# Patient Record
(demographics unavailable — no encounter records)

---

## 2024-10-09 NOTE — ASSESSMENT
[FreeTextEntry1] : *Remote history left breast cancer status postlumpectomy chemoradiation with apparent axillary node metastasis. Subsequent recurrences most recently 2019 treated with Herceptin. Will complete 5-year exemestane February 2025. PET scan November 2023 prompted by patient report of bone pain, showed no evidence of neoplasm.[place] oncologist Dr. Alvaro Mtz [phone]. Still has a Port-A-Cath that needs to be flushed every 2 months, Lastsurveillance imaging mmgm/US 7/29/24, lastPET 11/2023. Referred to oncology locally. Patient reminded to call to schedule follow up, PaC needs to be flushed q 2 months, next 10/2024. perhaps can be removed at this time. mmgm/US 7/29/24 without evidence of recurrent disease. PET/CT skull thigh 11/5/2023 showed no FDG avid disease, no comment on any pelvic or hip lesions in the rpt provided.  * GERD, chronically on pantoprazole without breakthrough symptoms solid food dysphagia or melena. EGD 5/27/21 Hpylori negative, moderate duodenitis with moderate villous blunting, but inconsistent with celiac disease.  *Neutrophilia, resolved. Outside labs show ANC rising from 7.5 to 9.7, March to May 2024. Pt denied fevers, night sweats, and rpt CBC normal ANC, CRP, blood culture. Tick panel pending. UCx see below. TKA 3/2024 looks normal.  *Hyperlipidemia. Chronically on simvastatin 40, LDL 83 HDL 42. LFTs normal. Continue.  *3 drug hypertension. Amlodipine 2.5 mg at bedtime, carvedilol 12.5 mg twice daily, and lisinopril dose reduced from 20 mg twice daily to 20 mg at bedtime in theory. Pt confused re which BP meds she's taking, when. Will bring to f/u appt.  . No known cardiovascular disease per patient,but sees cardiology annually. PCP Dr. Pasquale Baird [phone]. Normal microalbumin, eGFR 48. She had echo and stress test in the past, apparently not recently. BP acceptable despite lisinopril dose reduction in office, but home radings 160s-170s/80s in AM, 140s-150s/70s in PM.  Need confirm compliance and accuracy, while avoiding orthostatism.  *Left macular degeneration. Just saw her ophthalmologist August 2024. Never injected. Does not take AREDS.  *Vitamin D deficiency.  *Osteopenia. Chronically on 2000 IU vitamin D3. S 0, Hip -2.1 DEXA8/2024  *Right total knee arthroplasty, March 2024. Completed physical therapy. Doing well. Denies giveaway weakness or pain in her other joints.  * Routine adult health maintenance Vaccinations: Tdap Prevnar 20 Shingrix COVID flu indicated, outside vaccination record from PCP not provided.Gave flu today.  Colon cancer screening: Last colonoscopy around age 80. Denies personal history of adenomas or family history colorectal cancer Breast cancer Surveillance: see above Cervical cancer screen screening: Aged out Aged out of H CV HIV screening Time 25min

## 2024-10-09 NOTE — HISTORY OF PRESENT ILLNESS
[FreeTextEntry1] : f/u HTN [de-identified] : Most pleasant 85-year-old white female recentlynmoved from [place] recently troubled by lightheadedness while upright, never while supine, with apparent recent readdition of lisinopril several months ago to her other 2 blood pressure medicines, with 10mmHg drop supine to sitting in the office. EKG head CT labs non contributory. Lisinopril dose reduction with relief of orthostatic symptoms.   Past medical history significant for: *remote history of Left breast cancer with jessie and potentially bony metastatic involvement status post lumpectomy, unspecified chemo radiation, back in the 1990s. subsequent recurrences most recently 2019 treated with Herceptin via her Port-A-Cath, which she completed. Will complete 5-year exemestane February 2025.PET scan for some bone pain 2023 showed no evidence of neoplasm. She no longer needs the Port-A-Cath. The Port-A-Cath requires flushing every 2 months, last flushed around August 1. * GERD, chronically on pantoprazole without breakthrough symptoms solid food dysphagia or melena. She had an [id number]. *Hyperlipidemia. Chronically on simvastatin 40. *3 drug hypertension. No known cardiovascular disease per patient's, but sees cardiology annually. She had echo and stress test in the past, apparently not recently. *Left macular degeneration. Just saw her ophthalmologist August 2024. Never injected. *Vitamin D deficiency. Chronically on 9696-9138 IU vitamin D3.Osteopenia on dexa 2024 *Right total knee arthroplasty, March 2024. Completed physical therapy. Doing well. Denies giveaway weakness or pain in her other joints.

## 2024-10-18 NOTE — HISTORY OF PRESENT ILLNESS
[FreeTextEntry1] : Breast cancer hx F/U HTN [de-identified] : Most pleasant 85-year-old white female recentlynmoved from [place] recently troubled by lightheadedness while upright, never while supine, with apparent recent readdition of lisinopril several months ago to her other 2 blood pressure medicines, with 10mmHg drop supine to sitting in the office. EKG head CT labs non contributory. Lisinopril dose reduction with relief of orthostatic symptoms.  We have been unsuccessful in getting her seen by oncology, not on active chemo, just exemestane, but has a Port-A-Cath that needs to be flushed and/or removed.  Referral sent in August, outside records scanned into the system August 20.   Past medical history significant for: *remote history of Left breast cancer with jessie and potentially bony metastatic involvement status post lumpectomy, unspecified chemo radiation, back in the 1990s. subsequent recurrences most recently 2019 treated with Herceptin via her Port-A-Cath, which she completed. Will complete 5-year exemestane February 2025.PET scan for some bone pain 2023 showed no evidence of neoplasm. She no longer needs the Port-A-Cath. The Port-A-Cath requires flushing every 2 months, last flushed around August 1. * GERD, chronically on pantoprazole without breakthrough symptoms solid food dysphagia or melena. She had an [id number]. *Hyperlipidemia. Chronically on simvastatin 40. *3 drug hypertension. No known cardiovascular disease per patient's, but sees cardiology annually. She had echo and stress test in the past, apparently not recently. *Left macular degeneration. Just saw her ophthalmologist August 2024. Never injected. *Vitamin D deficiency. Chronically on 5440-0478 IU vitamin D3.Osteopenia on dexa 2024 *Right total knee arthroplasty, March 2024. Completed physical therapy. Doing well. Denies giveaway weakness or pain in her other joints.

## 2024-10-18 NOTE — ASSESSMENT
[FreeTextEntry1] : *Remote history left breast cancer status postlumpectomy chemoradiation with apparent axillary node metastasis. Subsequent recurrences most recently 2019 treated with Herceptin. Will complete 5-year exemestane February 2025. PET scan November 2023 prompted by patient report of bone pain, showed no evidence of neoplasm.[place] oncologist Dr. Alvaro Mtz [phone]. Still has a Port-A-Cath that needs to be flushed every 2 months, Last surveillance imaging mmgm/US 7/29/24, lastPET 11/2023. Referred to oncology locally. Patient reminded to call to schedule follow up, PaC needs to be flushed q 2 months, next 10/2024. perhaps can be removed at this time. mmgm/US 7/29/24 without evidence of recurrent disease. PET/CT skull thigh 11/5/2023 showed no FDG avid disease, no comment on any pelvic or hip lesions in the rpt provided.  * GERD, chronically on pantoprazole without breakthrough symptoms solid food dysphagia or melena. EGD 5/27/21 Hpylori negative, moderate duodenitis with moderate villous blunting, but inconsistent with celiac disease.  *Neutrophilia, resolved. Outside labs show ANC rising from 7.5 to 9.7, March to May 2024. Pt denied fevers, night sweats, and rpt CBC normal ANC, CRP, blood culture. Tick panel pending. UCx see below. TKA 3/2024 looks normal.  *Hyperlipidemia. Chronically on simvastatin 40, LDL 83 HDL 42. LFTs normal. Continue.  *3 drug hypertension. Amlodipine 2.5 mg at bedtime, carvedilol 12.5 mg twice daily, and lisinopril dose reduced from 20 mg twice daily to 20 mg at bedtime in theory. Pt confused re which BP meds she's taking, when. Will bring to f/u appt.  . No known cardiovascular disease per patient,but sees cardiology annually. PCP Dr. Pasquale Baird [phone]. Normal microalbumin, eGFR 48. She had echo and stress test in the past, apparently not recently. BP acceptable despite lisinopril dose reduction in office, but home radings 160s-170s/80s in AM, 140s-150s/70s in PM.  Need confirm compliance and accuracy, while avoiding orthostatism.  *Left macular degeneration. Just saw her ophthalmologist August 2024. Never injected. Does not take AREDS.  *Vitamin D deficiency. *Osteopenia. Chronically on 2000 IU vitamin D3. S 0, Hip -2.1 DEXA8/2024  *Right total knee arthroplasty, March 2024. Completed physical therapy. Doing well. Denies giveaway weakness or pain in her other joints.  * Routine adult health maintenance Vaccinations: Tdap Prevnar 20 2024 Shingrix COVID ndicated, outside vaccination record from PCP not provided.Gave Prevnar 20 today.  Colon cancer screening: Last colonoscopy around age 80. Denies personal history of adenomas or family history colorectal cancer Breast cancer Surveillance: see above Cervical cancer screen screening: Aged out Aged out of H CV HIV screening Time 25min.

## 2024-11-04 NOTE — BEGINNING OF VISIT
[0] : 2) Feeling down, depressed, or hopeless: Not at all (0) [PHQ-2 Negative] : PHQ-2 Negative [[id number]] : 0 [Pain Scale: ___] : On a scale of 1-10, today the patient's pain is a(n) [unfilled]. [Former] : Former [15-19] : 15-19 [> 15 Years] : > 15 Years [Date Discussed (MM/DD/YY): ___] : Discussed: [unfilled] [With Patient/Caregiver] : with Patient/Caregiver [Abdominal Pain] : no abdominal pain [Vomiting] : no vomiting [Constipation] : no constipation [Diarrhea Character] : Diarrhea: Grade 0

## 2024-11-04 NOTE — BEGINNING OF VISIT
[0] : 2) Feeling down, depressed, or hopeless: Not at all (0) [PHQ-2 Negative] : PHQ-2 Negative [[id number]] : 0 [Pain Scale: ___] : On a scale of 1-10, today the patient's pain is a(n) [unfilled]. [Former] : Former [15-19] : 15-19 [> 15 Years] : > 15 Years [Date Discussed (MM/DD/YY): ___] : Discussed: [unfilled] [With Patient/Caregiver] : with Patient/Caregiver [Abdominal Pain] : no abdominal pain [Vomiting] : no vomiting [Constipation] : no constipation [Diarrhea Character] : Diarrhea: Grade 0

## 2024-11-04 NOTE — BEGINNING OF VISIT
[0] : 2) Feeling down, depressed, or hopeless: Not at all (0) [PHQ-2 Negative] : PHQ-2 Negative [[id number]] : 0 [Pain Scale: ___] : On a scale of 1-10, today the patient's pain is a(n) [unfilled]. [Former] : Former [15-19] : 15-19 [> 15 Years] : > 15 Years [Date Discussed (MM/DD/YY): ___] : Discussed: [unfilled] [With Patient/Caregiver] : with Patient/Caregiver [Abdominal Pain] : no abdominal pain [Vomiting] : no vomiting [Constipation] : no constipation [Diarrhea Character] : Diarrhea: Grade 0

## 2024-11-07 NOTE — PHYSICAL EXAM
[Normal Conjunctiva] : normal conjunctiva [Normal Gait] : normal gait [No Edema] : no edema [Normal] : alert and oriented, normal memory [de-identified] : well appearing [de-identified] : supple [de-identified] : JVP ~ 7 cm H20, RRR, s1, s2, no murmurs, chemo port present [de-identified] : unlabored respirations, clear lung fields [de-identified] : non-distended

## 2024-11-07 NOTE — REVIEW OF SYSTEMS
[Headache] : no headache [Blurry Vision] : no blurred vision [SOB] : no shortness of breath [Chest Discomfort] : no chest discomfort [Syncope] : no syncope [Cough] : no cough [Abdominal Pain] : no abdominal pain [Rash] : no rash [Dizziness] : no dizziness [Easy Bruising] : no tendency for easy bruising

## 2024-11-07 NOTE — CARDIOLOGY SUMMARY
[de-identified] : ECG (9/12/24): sinus bradycardia, diffuse low voltage, nonspecific ST abnormalities ECG (11/7/24): sinus rhythm [de-identified] : TTE (9/2024): LVEF 55-60%. Normal RV size and function. Moderate posterior MAC. Mild MR. AV not well visualized, appears calcified with decreased opening. Mild AR. No pericardial effusion.

## 2024-11-07 NOTE — CARDIOLOGY SUMMARY
[de-identified] : ECG (9/12/24): sinus bradycardia, diffuse low voltage, nonspecific ST abnormalities ECG (11/7/24): sinus rhythm [de-identified] : TTE (9/2024): LVEF 55-60%. Normal RV size and function. Moderate posterior MAC. Mild MR. AV not well visualized, appears calcified with decreased opening. Mild AR. No pericardial effusion.

## 2024-11-07 NOTE — ASSESSMENT
[FreeTextEntry1] : Assessment: Henrietta Dudley is an 85 year old woman with past medical history of Breast cancer (s/p left mastectomy and chemotherapy, on Exemestane), Hypertension, Hyperlipidemia & GERD who presents for follow up visit.  The patient reports that she recently moved from [place]. She reports feeling well, denies exertional angina or dyspnea. ECG consistent with normal sinus rhythm. BP stable. Echo (9/2024) consistent with normal LVEF 55-60%, normal RV size and function, moderate mitral annular calcification and aortic valve not well visualized but appears calcified with decreased opening. Labs (8/2024) consistent with LDL 83 mg/dl and GFR 48.  Recommendations: [] Hypertension: BP appears stable on current medical therapy, continue: Amlodipine 2.5 mg daily, Carvedilol 12.5 mg BID and Lisinopril 20 mg daily. Recommend DASH diet plan.  [] Hyperlipidemia: LDL well controlled at 83 mg/dl, continue Simvastatin 40 mg daily [] History of cardiomyopathy: Prior records from [place] obtained and patient had history of mild cardiomyopathy with reported LVEF 45-50%, however echo here with LVEF 55-60%. No clinical signs of CHF that would warrant guideline directed medical therapy at this time. [] Return to office:  4 months

## 2024-11-07 NOTE — HISTORY OF PRESENT ILLNESS
[FreeTextEntry1] : Henrietta Dudley is an 85 year old woman with past medical history of Breast cancer (s/p left mastectomy and chemotherapy, on Exemestane), Hypertension, Hyperlipidemia & GERD who presents for follow up visit.  Since her last visit the patient reports feeling well. She denies exertional chest pain or shortness of breath.

## 2024-11-07 NOTE — PHYSICAL EXAM
[Normal Conjunctiva] : normal conjunctiva [Normal Gait] : normal gait [No Edema] : no edema [Normal] : alert and oriented, normal memory [de-identified] : well appearing [de-identified] : supple [de-identified] : JVP ~ 7 cm H20, RRR, s1, s2, no murmurs, chemo port present [de-identified] : unlabored respirations, clear lung fields [de-identified] : non-distended

## 2025-01-03 NOTE — PHYSICAL EXAM
[Normal] : no acute distress, well nourished, well developed and well-appearing [de-identified] :  right low back tenderness.  No trochanteric  Or SI joint tenderness.  Positive straight leg raise. able to march on her heels or balls of her feet.  Sensation preserved to light touch medial versus lateral foot no pain with lumbar extension.

## 2025-01-03 NOTE — HISTORY OF PRESENT ILLNESS
[FreeTextEntry1] : Right low back pain radiating to foot [de-identified] : Most pleasant 85-year-old white female with past medical history significant for: *remote history of Left breast cancer with jessie and potentially bony metastatic involvement status post lumpectomy, unspecified chemo radiation, back in the 1990s. subsequent recurrences most recently 2019 treated with Herceptin via her Port-A-Cath, which she completed. Will complete 5-year exemestane February 2025.PET scan for some bone pain 2023 showed no evidence of neoplasm. She apparently no longer needs the Port-A-Cath. The Port-A-Cath requires flushing every 2 months, last flushed around August 1. * GERD, chronically on pantoprazole without breakthrough symptoms solid food dysphagia or melena. She had an [id number]. *Hyperlipidemia. Chronically on simvastatin 40. *3 drug hypertension. No known cardiovascular disease per patient's, but sees cardiology annually. She had echo and stress test in the past, apparently not recently. *Left macular degeneration. Just saw her ophthalmologist August 2024. Never injected. *Vitamin D deficiency. Chronically on 0375-1677 IU vitamin D3.Osteopenia on dexa 2024 *Right total knee arthroplasty, March 2024. Completed physical therapy. Doing well. Denies giveaway weakness or pain in her other joints.  ...who attends for 2 day history of Right low back pain radiating to right lateral foot.  denies new incontinence saddle anesthesia leg numbness or weakness.  Pain worse with lumbar flexion.  She has not fallen.  Tried a little bit of Tylenol with incomplete benefit.  Has been working as an aid.

## 2025-01-03 NOTE — ASSESSMENT
[FreeTextEntry1] : *  Right S1 radiculopathy.  Short course NSAIDs muscle relaxant at bedtime scheduled Tylenol 1000 mg 3 times daily PT x-ray referral to orthospine in a month if not improving.    Time 20 minutes

## 2025-01-13 NOTE — PHYSICAL EXAM
[Antalgic] : antalgic [SLR] : positive straight leg raise [de-identified] : seen with her daughter [de-identified] : right knee incision [de-identified] : ACC: [id number] EXAM: XR LS SPINE W OBLIQUES MIN 4V ORDERED BY: CLAUDE M BRIDGES  PROCEDURE DATE: 01/03/2025    INTERPRETATION: Radiographs of the lumbar spine CPT 60811  CLINICAL INFORMATION: Low back pain x3 days.  TECHNIQUE: Frontal and lateral views of the lumbar spine, limited oblique views, and lateral coned-down view of the lumbosacral junction were obtained.  FINDINGS: No previous examinations are available for review.  No gross vertebral body fracture is demonstrated. Significant degenerative changes with osteophyte formation, disc space narrowing, endplate sclerosis, and vacuum disc phenomenon are noted at the L2-L3 level. Disc space narrowing is also noted at the L3-L4, and L4-L5 levels. The sacroiliac joints appear symmetric bilaterally. Lordosis is maintained.  Incidental note is made of calcification of the distal abdominal aorta.  IMPRESSION: Degenerative changes noted in the lumbar spine as noted above.  --- End of Report ---      OMA WRAY MD; Attending Radiologist This document has been electronically signed. Alexander 3 2025 3:16PM

## 2025-01-13 NOTE — PHYSICAL EXAM
[Antalgic] : antalgic [SLR] : positive straight leg raise [de-identified] : seen with her daughter [de-identified] : right knee incision [de-identified] : ACC: [id number] EXAM: XR LS SPINE W OBLIQUES MIN 4V ORDERED BY: CLAUDE M BRIDGES  PROCEDURE DATE: 01/03/2025    INTERPRETATION: Radiographs of the lumbar spine CPT 21108  CLINICAL INFORMATION: Low back pain x3 days.  TECHNIQUE: Frontal and lateral views of the lumbar spine, limited oblique views, and lateral coned-down view of the lumbosacral junction were obtained.  FINDINGS: No previous examinations are available for review.  No gross vertebral body fracture is demonstrated. Significant degenerative changes with osteophyte formation, disc space narrowing, endplate sclerosis, and vacuum disc phenomenon are noted at the L2-L3 level. Disc space narrowing is also noted at the L3-L4, and L4-L5 levels. The sacroiliac joints appear symmetric bilaterally. Lordosis is maintained.  Incidental note is made of calcification of the distal abdominal aorta.  IMPRESSION: Degenerative changes noted in the lumbar spine as noted above.  --- End of Report ---      OMA WRAY MD; Attending Radiologist This document has been electronically signed. Alexander 3 2025 3:16PM

## 2025-02-07 NOTE — HISTORY OF PRESENT ILLNESS
[FreeTextEntry8] : Most pleasant 85-year-old white female with past medical history significant for: *remote history of Left breast cancer with jessie and potentially bony metastatic involvement status post lumpectomy, unspecified chemo radiation, back in the 1990s. subsequent recurrences most recently 2019 treated with Herceptin via her Port-A-Cath, which she completed. Will complete 5-year exemestane February 2025.PET scan for some bone pain 2023 showed no evidence of neoplasm. She apparently no longer needs the Port-A-Cath. The Port-A-Cath requires flushing every 2 months, last flushed around August 1. * GERD, chronically on pantoprazole without breakthrough symptoms solid food dysphagia or melena. She had an [id number]. *Hyperlipidemia. Chronically on simvastatin 40. *3 drug hypertension. No known cardiovascular disease per patient's, but sees cardiology annually. She had echo and stress test in the past, apparently not recently. *Left macular degeneration. Just saw her ophthalmologist August 2024. Never injected. *Vitamin D deficiency. Chronically on 2316-4693 IU vitamin D3.Osteopenia on dexa 2024 *Right total knee arthroplasty, March 2024. Completed physical therapy. Doing well. Denies giveaway weakness or pain in her other joints.  ... Seen January 3 for right-sided sciatica treated with gabapentin and Medrol Dosepak and orthospine 3 weeks ago subsequently seen in urgent care February 3 for 3-day history of sore throat cough congestion with a unremarkable exam, no swab done, prescribed Tessalon and azelastine, elevated blood pressure in urgent care, who attends for urgent care follow-up.  Flu shot October 9, 2024, last COVID booster more remotely. No RSV.  She has cough, wheeze, post tussive nausea. Trouble sleeping w cough.

## 2025-02-07 NOTE — ASSESSMENT
[FreeTextEntry1] : *Asthmatic bronchitis, postviral.  Given duration of symptoms seeming progression of cough though nonproductive without fever we will update chest x-ray start prednisone taper over 12 days starting at 30 mg daily prescribed azithromycin and renew her albuterol.  *History of breast cancer renewed her exemestane.  Messaged her oncologist about her illness as she will be unable to attend appointment today.  Time 25 minutes

## 2025-02-07 NOTE — PHYSICAL EXAM
[No Lymphadenopathy] : no lymphadenopathy [Normal] : affect was normal and insight and judgment were intact [de-identified] : Clear rhinorrhea minimal coryza.  Oropharynx clear.  TMs appear normal. [de-identified] : Scattered squeaks and expiratory wheezes.  Decreased air entry.

## 2025-02-15 NOTE — PHYSICAL EXAM
[Normal Conjunctiva] : normal conjunctiva [Normal Gait] : normal gait [No Edema] : no edema [Normal] : alert and oriented, normal memory [de-identified] : well appearing [de-identified] : supple [de-identified] : JVP ~ 7 cm H20, RRR, s1, s2, 3/6 systolic murmur, chemo port present [de-identified] : unlabored respirations, clear lung fields [de-identified] : non-distended

## 2025-02-15 NOTE — CARDIOLOGY SUMMARY
[de-identified] : ECG (9/12/24): sinus bradycardia, diffuse low voltage, nonspecific ST abnormalities ECG (2/14/25): sinus bradycardia, borderline inferior Q waves [de-identified] : TTE (9/2024): LVEF 55-60%. Normal RV size and function. Moderate posterior MAC. Mild MR. AV not well visualized, appears calcified with decreased opening. Mild AR. No pericardial effusion.

## 2025-02-15 NOTE — REVIEW OF SYSTEMS
[SOB] : no shortness of breath [Cough] : no cough [Dyspnea on exertion] : dyspnea during exertion [Headache] : no headache [Blurry Vision] : no blurred vision [Chest Discomfort] : no chest discomfort [Lower Ext Edema] : no extremity edema [Palpitations] : no palpitations [Syncope] : no syncope [Abdominal Pain] : no abdominal pain [Rash] : no rash [Dizziness] : no dizziness [Easy Bruising] : no tendency for easy bruising

## 2025-02-15 NOTE — HISTORY OF PRESENT ILLNESS
[FreeTextEntry1] : Henrietta Dudley is an 85 year old woman with past medical history of Breast cancer (s/p left mastectomy and chemotherapy, on Exemestane), Hypertension, Hyperlipidemia & GERD who presents for follow up visit.  The patient reports that she has had shortness of breath on exertion, sometimes associated with wheezing and mostly relieved with inhaler. Her PCP treated her for asthma exacerbation with prednisone and albuterol which has helped most of her symptoms. She denies chest pain on exertion. Denies leg swelling. Has noticed shortness of breath when laying flat.

## 2025-02-15 NOTE — PHYSICAL EXAM
[Normal Conjunctiva] : normal conjunctiva [Normal Gait] : normal gait [No Edema] : no edema [Normal] : alert and oriented, normal memory [de-identified] : well appearing [de-identified] : supple [de-identified] : JVP ~ 7 cm H20, RRR, s1, s2, 3/6 systolic murmur, chemo port present [de-identified] : unlabored respirations, clear lung fields [de-identified] : non-distended

## 2025-02-15 NOTE — CARDIOLOGY SUMMARY
[de-identified] : ECG (9/12/24): sinus bradycardia, diffuse low voltage, nonspecific ST abnormalities ECG (2/14/25): sinus bradycardia, borderline inferior Q waves [de-identified] : TTE (9/2024): LVEF 55-60%. Normal RV size and function. Moderate posterior MAC. Mild MR. AV not well visualized, appears calcified with decreased opening. Mild AR. No pericardial effusion.

## 2025-02-15 NOTE — ASSESSMENT
[FreeTextEntry1] : Assessment: Henrietta Dudley is an 85 year old woman with past medical history of Breast cancer (s/p left mastectomy and chemotherapy, on Exemestane), Hypertension, Hyperlipidemia & GERD who presents for follow up visit.  The patient reports that she has had shortness of breath on exertion, sometimes associated with wheezing and mostly relieved with inhaler and was treated for asthma exacerbation with prednisone and albuterol which has helped most of her symptoms. She denies angina. ECG today consistent with sinus bradycardia and borderline inferior Q waves which is similar to a prior ECG. Echo (9/2024) consistent with normal LVEF 55-60%, normal RV size and function, moderate mitral annular calcification and aortic valve not well visualized but appears calcified with decreased opening. Labs (8/2024) consistent with LDL 83 mg/dl and GFR 48.  Recommendations: [] Dyspnea on exertion: S/p treatment for asthma exacerbation, however some symptoms still persist. Due to systolic murmur on exam, will repeat echo to re-evaluate aortic valve and also LVEF. Continue albuterol. [] Hypertension: BP appears stable on current medical therapy, continue: Amlodipine 2.5 mg daily, Carvedilol 12.5 mg BID and Lisinopril 20 mg daily. Recommend DASH diet plan.  [] Hyperlipidemia: LDL well controlled at 83 mg/dl, continue Simvastatin 40 mg daily [] History of cardiomyopathy: Prior records from [place] obtained and patient had history of mild cardiomyopathy with reported LVEF 45-50%, however echo here with LVEF 55-60%. Repeating echo as per above [] Return to office: Patient will return for echo

## 2025-02-19 NOTE — END OF VISIT
[FreeTextEntry3] : Patient being seen per physician's primary plan of care.  [Time Spent: ___ minutes] : I have spent [unfilled] minutes of time on the encounter which excludes teaching and separately reported services.

## 2025-02-19 NOTE — REVIEW OF SYSTEMS
[Diarrhea: Grade 0] : Diarrhea: Grade 0 [Joint Pain] : joint pain [Negative] : Allergic/Immunologic [Joint Stiffness] : no joint stiffness [Muscle Pain] : no muscle pain [Muscle Weakness] : no muscle weakness [FreeTextEntry9] : chronic lower back pain

## 2025-02-19 NOTE — ASSESSMENT
[FreeTextEntry1] : She is an 84 y/o  F with (T2N2M1) oligometastatic left breast ER >90%, ID positive, Her2 positive breast cancer who was treated with paclitaxel/ trastuzumab followed by trastuzumab every 3 weeks x 3 years and exemestane endocrine therapy. She had prior RT to oligometastatic sites: sternum and left femoral head. She had Pet/ CT 11/2023 which showed no evidence of disease. Reviewed we would continue to monitor off Her2 directed therapy and have her remain on endocrine therapy. She continues to tolerate exemestane well without any AE. Questions answered to her satisfaction. She is agreeable with plan. Next follow up in 4 months but earlier if any new symptoms.   Port flush: She would like to continue to maintain port for now and will maintain port flushes q2 months. Will have port flush today.    Bone mets: has been off zolendronic acid after 2 years. Will continue to monitor bones.

## 2025-02-19 NOTE — HISTORY OF PRESENT ILLNESS
[Disease: _____________________] : Disease: [unfilled] [AJCC Stage: ____] : AJCC Stage: [unfilled] [de-identified] : Age 80: metastatic breast cancer to the bone: oligometastatic She was treated on 9/15/2019 T2N2 s/p lumpectomy and ALND showing ER>90%, NJ positive, Her2 3+ breast cancer. Treated by Dr Baird/ [facility] in [place]. She underwent weekly TH followed by 1 year of trastuzumab. She had Pet/ CT done on 11/2020 showing sclerotic metastatic disease in the L femoral head. She underwent RT to the L femoral head. She was initially started on anastrozole 1/2020 but experienced insomnia and nocturia. She was on single agent Her2 directed therapy: trastuzumab for 3 years and then stopped in 2022. She was switched to exemestane on 2/2020 and has remained on therapy. She has been on surveillance with Pet/ CT and last Pet/ CT done on 11/72023 showing no new evidence of disease. She had completed 2 years of zolendronic acid.  [de-identified] : ER >90%, MO positive, Her2 positive  [de-identified] : TH 9/2019 to 2020 palliative RT to the left breast, level 3 axilla, supraclavicular region, internal mammary nodes, left sternal lesion and femoral head.  trastuzumab completed 3 years of therapy and then stopped (due to cardiomyopathy on Echo EF 45-50% from 3/2023) anastrozole 1/2020 zolendronic acid x 2 years  exemestane 2/2020 to present  [de-identified] : She continues to tolerate exemestane and has not noticed any side effects. She has a history of arthritis in her lower back and has not noticed worsening arthritic pain on medication. She takes extra strength Tylenol once daily which is able to manage pain for the day. She denies any new bone pain. No new cough or headache. She would like to continue to maintain port for now.

## 2025-02-19 NOTE — PHYSICAL EXAM
[Restricted in physically strenuous activity but ambulatory and able to carry out work of a light or sedentary nature] : Status 1- Restricted in physically strenuous activity but ambulatory and able to carry out work of a light or sedentary nature, e.g., light house work, office work [Normal] : affect appropriate [de-identified] : left lumpectomy without any palpable masses or axillary LN; no abnl masses over the R breast

## 2025-03-17 NOTE — PHYSICAL EXAM
[Normal] : affect was normal and insight and judgment were intact [de-identified] : Occasionally vague historian [de-identified] : Decreased breath sounds right side.  Dull to percussion, even with the patient upright. no crackles or wheeze. [de-identified] : Right upper sternal border murmur 3 out of 6, regular rate.

## 2025-03-17 NOTE — ASSESSMENT
[FreeTextEntry1] : *Abnormal chest CT.  Will attempt to obtain the report and discharge summary, plan follow-up chest CT by May 1st.  *Outside indication of need for EGD.  Patient denies epigastric pain solid food dysphagia melena.  Reports "lump in throat" which may be of relevance.  I am not privy to her most recent CBC.  Will attempt to obtain discharge summary to determine indication for EGD.  Has referral to GI in the [facility] system.  *Intermittent dyspnea, not clearly with exertion.  Despite antibiotics in and outpatient, still intermittently short of breath without cough fever.  We walked her quickly up and down the [place] about 150 feet dropping her baseline oxygen saturation from 97 down to 93% raising her resting heart rate from 67-78.  She felt short of breath, but post exercise exam showed only a isolated inspiratory squeak in the right lung base, and no exertional hypoxemia.  Though potentially related to abnormal CT findings, noted as well echo change therefore query angina; we will engage pulmonology for consideration of pulmonary function tests FeNO though testing sooner potential confoundment with recent PNA dx. asking for discharge summary to review recent labs as well.  *Hypertension.  Lisinopril 20 daily, carvedilol 12.5 mg twice daily, recent amlodipine dose increased to 5 mg.  Always problematic to adjust blood pressure medicine in the setting of acute illness but blood pressures are not particularly low, continue increased amlodipine for now.  She is stooling without significant peripheral edema.  *remote history of Left breast cancer with jessie and potentially bony metastatic involvement status post lumpectomy, unspecified chemo radiation, back in the 1990s. subsequent recurrences most recently 2019 treated with Herceptin via her Port-A-Cath, which she completed. Will complete 5-year exemestane February 2025.PET scan for some bone pain 2023 showed no evidence of neoplasm. She apparently no longer needs the Port-A-Cath. The Port-A-Cath requires flushing every 2 months, last flushed around August 1. * GERD, chronically on pantoprazole without breakthrough symptoms solid food dysphagia or melena. She had an [id number]. *Hyperlipidemia. Chronically on simvastatin 40. *3 drug hypertension. No known cardiovascular disease per patient's, but sees cardiology annually. She had echo and stress test in the past, apparently not recently. *Left macular degeneration. Just saw her ophthalmologist August 2024. Never injected. *Vitamin D deficiency. Chronically on 2955-4314 IU vitamin D3.Osteopenia on dexa 2024 *Right total knee arthroplasty, March 2024. Completed physical therapy. Doing well. Denies giveaway weakness or pain in her other joints.  Disposition: Follow-up 1 month

## 2025-03-17 NOTE — HISTORY OF PRESENT ILLNESS
[Post-hospitalization from ___ Hospital] : Post-hospitalization from [unfilled] Hospital [Admitted on: ___] : The patient was admitted on [unfilled] [Discharged on ___] : discharged on [unfilled] [Discharge Med List] : discharge medication list [FreeTextEntry3] : pt comes with discharge instructions. [FreeTextEntry2] : Most pleasant 85-year-old white female with past medical history significant for: *remote history of Left breast cancer with jessie and potentially bony metastatic involvement status post lumpectomy, unspecified chemo radiation, back in the 1990s. subsequent recurrences most recently 2019 treated with Herceptin via her Port-A-Cath, which she completed. Will complete 5-year exemestane February 2025.PET scan for some bone pain 2023 showed no evidence of neoplasm. She apparently no longer needs the Port-A-Cath. The Port-A-Cath requires flushing every 2 months, last flushed around August 1. * GERD, chronically on pantoprazole without breakthrough symptoms solid food dysphagia or melena. She had an [id number]. *Hyperlipidemia. Chronically on simvastatin 40. *3 drug hypertension. No known cardiovascular disease per patient's, but sees cardiology annually. She had echo and stress test in the past, apparently not recently. *Left macular degeneration. Just saw her ophthalmologist August 2024. Never injected. *Vitamin D deficiency. Chronically on 3356-4914 IU vitamin D3.Osteopenia on dexa 2024 *Right total knee arthroplasty, March 2024. Completed physical therapy. Doing well. Denies giveaway weakness or pain in her other joints.  ...Who we saw in early February for post viral asthmatic bronchitis in early February prescribed albuterol Z-Jer, prednisone taper; then c/o increasing sciatica pain rx diclofenac 75 bid and zanaflex 4mg hs 2/25, rpted didn't tolerate diclofenac (doesn't recall reason), then  3/5 lightheaded, hypotensive in the field called 911 was brought to the ER where she got some IV fluids pressure in the ER after and route IV fluids 103/65 asked to hold tizanidine. 3/7 while talking to Dr. Meng who obtained echo showing new inferior wall segment regional wall motion abnormality with plan for nuclear stress test noted to be short of breath, advised to seek medical care.  No discharge summary provided, follow-up within the [facility] system with GI for consideration of EGD, cardiology for nuclear stress test and recommendation to repeat chest CT in 4 to 6 months.  Discharge diagnosis right sided community-acquired pneumonia. Discharged on cefuroxime doxycycline increased amlodipine 5 mg.  She stopped one of the antibiotics due to GI distress.  She does not remember which 1.  Still feeling intermittently short of breath, panicky, no history of arrhythmia.  Denies ever fever cough.  Denies wheeze currently.  Stooling with prune juice.  Denies orthopnea exertional chest pain or pressure. Denies sciatica pain low back pain currently. She reports having used up her albuterol, prescribed last month with 1 refill.  Remote light smoker.  Denies history of childhood asthma.

## 2025-03-28 NOTE — PHYSICAL EXAM
[No Respiratory Distress] : no respiratory distress  [No Accessory Muscle Use] : no accessory muscle use [Clear to Auscultation] : lungs were clear to auscultation bilaterally [Normal] : affect was normal and insight and judgment were intact [de-identified] : Decreased air entry. No crackles or wheeze.O2 sats supine x 3 min95 init then 97%, 97-98% upright.

## 2025-03-28 NOTE — ASSESSMENT
[FreeTextEntry1] : * recent hospitalization for pneumonia *Recent report of episodic dyspnea and wheeze, albuterol refractory *Mild eosinophila *Rule out CAD *Early MCI. possible untreated anxietyl. Will try to get her on inhaled corticosteroid and long-acting beta agonist, already on PPI and has had Incomplete cardiac evaluation but needs a look at her coronaries via [facility]. Also provided tid prn buspar.  Upcoming appointment with pulmonology [facility]  PFTs FeNO? PET/CT 11/2023 without intrathoracic pathology except coronary calcifications.  CBC CMP BNP yesterday pretty reassuring, chest x-ray clear.  TTE March 5 noted LVEF 63%, normal inspiratory collapse of the IVC suggesting normal right atrial pressure.  Aortic stenosis estimated at mild to moderate, 9 mm mean gradient normal RV LV size.  Some question about basal inferior wall hypokinesis, mild, and diastolic dysfunction; supposed to see [facility] cardiology for ischemia evaluation.  Pressures and weight stable here today.  It was pleasure visit with Ms. Dudley today.  Answer questions best I could. Disposition: Follow-up pulmonology cardiology, see me in 3 months Time 30 minutes

## 2025-03-28 NOTE — HISTORY OF PRESENT ILLNESS
[FreeTextEntry1] : episodic SoB [de-identified] : Most pleasant 85-year-old white female with past medical history significant for: *remote history of Left breast cancer with jessie and potentially bony metastatic involvement status post lumpectomy, unspecified chemo radiation, back in the 1990s. subsequent recurrences most recently 2019 treated with Herceptin via her Port-A-Cath, which she completed. Will complete 5-year exemestane February 2025.PET scan for some bone pain 2023 showed no evidence of neoplasm. She apparently no longer needs the Port-A-Cath. The Port-A-Cath requires flushing every 2 months, last flushed around August 1. * GERD, chronically on pantoprazole without breakthrough symptoms solid food dysphagia or melena. She had an [id number]. *Hyperlipidemia. Chronically on simvastatin 40. *3 drug hypertension. No known cardiovascular disease per patient's, but sees cardiology annually. She had echo and stress test in the past, apparently not recently. *Left macular degeneration. Just saw her ophthalmologist August 2024. Never injected. *Vitamin D deficiency. Chronically on 8719-6394 IU vitamin D3.Osteopenia on dexa 2024 *Right total knee arthroplasty, March 2024. Completed physical therapy. Doing well. Denies giveaway weakness or pain in her other joints.  ...who attends for Episodic shortness of breath, she reports that night while laying down and daughter notes stops to catch her breath with ambulation, has also noted wheezing.  Also appears to get short of breath and wheezy just sitting doing nothing.  This all started after hospital adminssion for pneumonia March 7. Has been using albuterol without benefit. Recent CXR BNP CMP CBC TTE looked pretty good actually.Denies exertional chest pain presyncopal symptoms.  A bit of a vague historian, fortunately daughter Leny adds periodic SoB at rest.  Has been working as an aid, minimal effort, 2 days a week.    Albuterol without benefit, no childhood history of asthma, but former smoker quit in her 40s, perhaps 20py at most and probably less. Daughter wonders if mom has lung cancer.

## 2025-04-07 NOTE — PHYSICAL EXAM
[Normal] : no acute distress, well nourished, well developed and well-appearing [de-identified] : no wheeze. Mildly decreased air entry.

## 2025-04-07 NOTE — ASSESSMENT
[FreeTextEntry1] : *RAD. *Mild Eosinophilia. ?Extrinsic allergic asthma. Advair/tessalon/ppi. Hold off on singulair, to see pulm PFT/FeNO.  *Anxiety. Insomnia. Buspar without benefit. TSH normal. Trial trazodone 50->100mg hs, and let me know outcome in a week.  Time 25min

## 2025-04-07 NOTE — HISTORY OF PRESENT ILLNESS
[FreeTextEntry1] : ER f/u [de-identified] : Most pleasant 85-year-old white female with past medical history significant for: *remote history of Left breast cancer with jessie and potentially bony metastatic involvement status post lumpectomy, unspecified chemo radiation, back in the 1990s. subsequent recurrences most recently 2019 treated with Herceptin via her Port-A-Cath, which she completed. Will complete 5-year exemestane February 2025.PET scan for some bone pain 2023 showed no evidence of neoplasm. She apparently no longer needs the Port-A-Cath. The Port-A-Cath requires flushing every 2 months, last flushed around August 1. * GERD, chronically on pantoprazole without breakthrough symptoms solid food dysphagia or melena. She had an [id number]. *Hyperlipidemia. Chronically on simvastatin 40. *3 drug hypertension. No known cardiovascular disease per patient's, but sees cardiology annually. She had echo and stress test in the past, apparently not recently. *Left macular degeneration. Just saw her ophthalmologist August 2024. Never injected. *Vitamin D deficiency. Chronically on 6201-3147 IU vitamin D3.Osteopenia on dexa 2024 *Right total knee arthroplasty, March 2024. Completed physical therapy. Doing well. Denies giveaway weakness or pain in her other joints.  ...who attends for ER followup for Episodic shortness of breath, wheeze at night, last seen 3/28 no orthodeoxia started on advair /14 2 puffs bid then, only to end up in ED 3/31 with diffuse inspiratory and expiratory wheezing, tripoding, started on medrol dose pack and tessalon with improvement.   Pt denies childhood asthma, current itchy eyes runny nose. Is on protonix. Feels symptoms started after hospital admission for pneumonia March 7.   BNP trivially elevated in ED, CXR no acute changes. Former smoker quit in her 40s, perhaps 20py at most and probably less.

## 2025-04-09 NOTE — PROCEDURE
[FreeTextEntry1] : 4/9/2025  albuterol via neb  face mask  x one  tolerated and discussion on  proper neb use and medications

## 2025-04-09 NOTE — REVIEW OF SYSTEMS
[Cough] : cough [Sputum] : sputum [Wheezing] : wheezing [Fever] : no fever [Fatigue] : no fatigue [Recent Wt Gain (___ Lbs)] : ~T no recent weight gain [Chills] : no chills [Recent Wt Loss (___ Lbs)] : ~T no recent weight loss [Epistaxis] : no epistaxis [Sore Throat] : no sore throat [Nasal Congestion] : no nasal congestion [Postnasal Drip] : no postnasal drip [Dry Mouth] : no dry mouth [Sinus Problems] : no sinus problems [Hemoptysis] : no hemoptysis [Chest Tightness] : no chest tightness [Dyspnea] : no dyspnea [Chest Discomfort] : no chest discomfort [Palpitations] : no palpitations [Seasonal Allergies] : no seasonal allergies [GERD] : no gerd [Abdominal Pain] : no abdominal pain [Nausea] : no nausea [Vomiting] : no vomiting [Dysphagia] : no dysphagia [Bleeding] : no bleeding [Chronic Pain] : no chronic pain [Rash] : no rash [Blood Transfusion] : no blood transfusion [Clotting Disorder/ Frequent bleeding] : no clotting disorder/ frequent bleeding [Diabetes] : no diabetes [Thyroid Problem] : no thyroid problem [Obesity] : no obesity [TextBox_30] : clear

## 2025-04-09 NOTE — ASSESSMENT
[FreeTextEntry1] : 84y/o   female   1-    asthma  vs COPD ( ex-smoker and second hand smoke)    with wheezing      allergies  allergic rhinitis 2- h/o  left breast cancer  chemo RT to left  with recurrence now remission  3- vaccination per primary   recommendations 1- nebulizer x one today with teaching  2- albuterol MDI two inh  q  6  hour prn   discussed proper use 3- PFT 4- blood test 5- home care  prevention HEPA air filters  change linen x 2 week  vacuum rug   -discussion reviewed above in detail and    triggers - discussed medications

## 2025-04-09 NOTE — HISTORY OF PRESENT ILLNESS
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

## 2025-04-09 NOTE — PHYSICAL EXAM
[III] : Mallampati Class: III [Normal Appearance] : normal appearance [Supple] : supple [No Neck Mass] : no neck mass [No JVD] : no jvd [Normal Rate/Rhythm] : normal rate/rhythm [Normal S1, S2] : normal s1, s2 [No Murmurs] : no murmurs [No Resp Distress] : no resp distress [No Acc Muscle Use] : no acc muscle use [Clear to Auscultation Bilaterally] : clear to auscultation bilaterally [Kyphosis] : kyphosis [Benign] : benign [Not Tender] : not tender [No Masses] : no masses [Soft] : soft [No Hernias] : no hernias [Normal Bowel Sounds] : normal bowel sounds [Normal Gait] : normal gait [Gait - Sufficient For Exercise Testing] : gait sufficient for exercise testing [No Clubbing] : no clubbing [No Edema] : no edema [No Rash] : no rash [No Motor Deficits] : no motor deficits [Normal Affect] : normal affect [TextBox_2] : pleasant f no distress  no cough no sob [TextBox_11] : no lesion moist

## 2025-04-28 NOTE — HISTORY OF PRESENT ILLNESS
[FreeTextEntry1] :  aurea, recent [de-identified] : Most pleasant 85-year-old white female with past medical history significant for: *remote history of Left breast cancer with jessie and potentially bony metastatic involvement status post lumpectomy, unspecified chemo radiation, back in the 1990s. subsequent recurrences most recently 2019 treated with Herceptin via her Port-A-Cath, which she completed. Now on exemestane February 2025.PET scan for some bone pain 2023 showed no evidence of neoplasm.  * GERD, chronically on pantoprazole without breakthrough symptoms solid food dysphagia or melena. She had an [id number]. *Hyperlipidemia. Chronically on simvastatin 40. *3 drug hypertension. No known cardiovascular disease per patient's, but sees cardiology annually. She had echo and stress test in the past, recent TTE. *Left macular degeneration. ophthalmologist August 2024. Never injected. *Vitamin D deficiency. Chronically on 5486-7655 IU vitamin D3.Osteopenia on dexa 2024 *Right total knee arthroplasty, March 2024. Completed physical therapy. Doing well. Denies giveaway weakness or pain in her other joints.  ...who has been struggling with intermittent wheezing since pneumonia [facility] early 3/2025, started on ICS/JOSEP seemingly improving as no longer wheezing not using inhalers consistently, last flare 3/31 medrol dosepak.  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 New Eosinophilia 3/2025, vs 8/2024, normal total IgE. AEC 0.6 March 5 ,1 March 27, 1.9 March 31.  I had wanted to do MoCA on this pt, as I am increasingly concerned about her ability to well manage her meds. She's using a pill box. Her meds include a mix of diff tabs that my pill ID couldnt ID in her mobic bottle, which had a few mobic tabs left. Denies sciatica at this time. She also has apparently been taking 2.5 and 5 mg amlodipine after we increased amlodipine from 2.5 to 5mg 11/2024.

## 2025-04-28 NOTE — ASSESSMENT
[FreeTextEntry1] : *RAD. *Mild but rising Eosinophilia. Not wheezing not taking inhalers regularly. Makes extrinsic allergic asthma less likely. Nice allergy panels by Pulm, pt asked to avoid egg products. I messaged her Oncology team re recommendations for Heme, and referred pt to Allergy/Immunology. Will stop amlodipine and lisinopril, start losartan 100mg and check CBC BP in 7-10 days.  *Anxiety. Insomnia. Buspar without benefit. TSH normal. Trial trazodone 50->100mg hs, but not taking every night she reports. Uncertain benefit on nights she does take.  *MCI, likely. MoCA on rtn.I removed the pills the pt is no longer to take from her bag (buspar lisinopril amlodipine mobic and some unidentified capsules in the mobic bottle).  Time 35min

## 2025-04-28 NOTE — HISTORY OF PRESENT ILLNESS
[FreeTextEntry1] :  aurea, recent [de-identified] : Most pleasant 85-year-old white female with past medical history significant for: *remote history of Left breast cancer with jessie and potentially bony metastatic involvement status post lumpectomy, unspecified chemo radiation, back in the 1990s. subsequent recurrences most recently 2019 treated with Herceptin via her Port-A-Cath, which she completed. Now on exemestane February 2025.PET scan for some bone pain 2023 showed no evidence of neoplasm.  * GERD, chronically on pantoprazole without breakthrough symptoms solid food dysphagia or melena. She had an [id number]. *Hyperlipidemia. Chronically on simvastatin 40. *3 drug hypertension. No known cardiovascular disease per patient's, but sees cardiology annually. She had echo and stress test in the past, recent TTE. *Left macular degeneration. ophthalmologist August 2024. Never injected. *Vitamin D deficiency. Chronically on 1247-6522 IU vitamin D3.Osteopenia on dexa 2024 *Right total knee arthroplasty, March 2024. Completed physical therapy. Doing well. Denies giveaway weakness or pain in her other joints.  ...who has been struggling with intermittent wheezing since pneumonia StFr early 3/2025, started on ICS/JOSEP seemingly improving as no longer wheezing not using inhalers consistently, last flare 3/31 medrol dosepak.  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 New Eosinophilia 3/2025, vs 8/2024, normal total IgE. AEC 0.6 March 5 ,1 March 27, 1.9 March 31.  I had wanted to do MoCA on this pt, as I am increasingly concerned about her ability to well manage her meds. She's using a pill box. Her meds include a mix of diff tabs that my pill ID couldnt ID in her mobic bottle, which had a few mobic tabs left. Denies sciatica at this time. She also has apparently been taking 2.5 and 5 mg amlodipine after we increased amlodipine from 2.5 to 5mg 11/2024.

## 2025-05-14 NOTE — PHYSICAL EXAM
[Normal] : no posterior cervical lymphadenopathy and no anterior cervical lymphadenopathy [de-identified] : MoCA 18/30 in this high school graduate, consistent with dementia.

## 2025-05-14 NOTE — ASSESSMENT
[FreeTextEntry1] : *RAD. *Mild but rising Eosinophilia. Not wheezing not taking inhalers regularly. Makes extrinsic allergic asthma less likely. Nice allergy panels by Pulm, pt asked to avoid egg products. I messaged her Oncology team re recommendations for Heme, and referred pt to Allergy/Immunology. Stopped amlodipine and lisinopril, started losartan 100mg and check CBC now. Upcoming A&I consultation.  *Anxiety. Insomnia. Buspar without benefit. TSH normal. Trial trazodone 50->100mg hs, but not taking every night she reports. Uncertain benefit on nights she does take. Consistency and compliance confirmation will help management.  *HTN. Seemingly adequate control on losartan & coreg.  *Dementia, milld. MoCA 17/30 hs graduate. No movement disorder, hallucinations, CVA history on hCT 8/2024. La bel indiference demeanor. Most likely patient has early Alzheimer's disease.  Spoke with her daughter, asked her to oversee her mother setting up her medication and oversee compliance.  The first challenge will be trazodone dosing consistency.  In future may move forward with NMDA and cholinergic cognition enhancers to foster autonomy, independence.  *Abnormal chest CT. Will attempt to obtain the report and discharge summary, plan follow-up chest CT by May 1st.  *Outside indication of need for EGD. Patient denies epigastric pain solid food dysphagia melena. Reports "lump in throat" which may be of relevance. I am not privy to her most recent CBC. Will attempt to obtain discharge summary to determine indication for EGD. Has referral to GI in the [facility] system.  *remote history of Left breast cancer with jessie and potentially bony metastatic involvement status post lumpectomy, unspecified chemo radiation, back in the 1990s. subsequent recurrences most recently 2019 treated with Herceptin via her Port-A-Cath, which she completed. Will complete 5-year exemestane February 2025.PET scan for some bone pain 2023 showed no evidence of neoplasm. She apparently no longer needs the Port-A-Cath. The Port-A-Cath requires flushing every 2 months, last flushed around August 1.  * GERD, chronically on pantoprazole without breakthrough symptoms solid food dysphagia or melena. She had an [id number].  *Hyperlipidemia. Chronically on simvastatin 40.  *3 drug hypertension. No known cardiovascular disease per patient's, but sees cardiology annually. She had echo and stress test in the past, apparently not recently.  *Left macular degeneration. Just saw her ophthalmologist August 2024. Never injected.  *Vitamin D deficiency. Chronically on 1589-1591 IU vitamin D3.Osteopenia on dexa 2024  *Right total knee arthroplasty, March 2024. Completed physical therapy. Doing well. Denies giveaway weakness or pain in her other joints.  * Routine adult health maintenance Vaccinations: Tdap Prevnar 20 Shingrix COVID flu indicated, obtain outside vaccination record from PCP  Colon cancer screening: Last colonoscopy around age 80. Denies personal history of adenomas or family history colorectal cancer Breast cancer Surveillance: see above Cervical cancer screen [place]: Aged out  osteoporosis screening: Osteopenia on DEXA 2024 vit D 43, 2024 Aged out of H CV HIV screening [id number]

## 2025-05-14 NOTE — PHYSICAL EXAM
[Normal] : no posterior cervical lymphadenopathy and no anterior cervical lymphadenopathy [de-identified] : MoCA 18/30 in this high school graduate, consistent with dementia.

## 2025-05-14 NOTE — ASSESSMENT
[FreeTextEntry1] : *RAD. *Mild but rising Eosinophilia. Not wheezing not taking inhalers regularly. Makes extrinsic allergic asthma less likely. Nice allergy panels by Pulm, pt asked to avoid egg products. I messaged her Oncology team re recommendations for Heme, and referred pt to Allergy/Immunology. Stopped amlodipine and lisinopril, started losartan 100mg and check CBC now. Upcoming A&I consultation.  *Anxiety. Insomnia. Buspar without benefit. TSH normal. Trial trazodone 50->100mg hs, but not taking every night she reports. Uncertain benefit on nights she does take. Consistency and compliance confirmation will help management.  *HTN. Seemingly adequate control on losartan & coreg.  *Dementia, milld. [employer]CA 17/30 hs graduate. No movement disorder, hallucinations, CVA history on hCT 8/2024. La bel indiference demeanor. Most likely patient has early Alzheimer's disease.  Spoke with her daughter, asked her to oversee her mother setting up her medication and oversee compliance.  The first challenge will be trazodone dosing consistency.  In future may move forward with NMDA and cholinergic cognition enhancers to foster autonomy, independence.  *Abnormal chest CT. Will attempt to obtain the report and discharge summary, plan follow-up chest CT by May 1st.  *Outside indication of need for EGD. Patient denies epigastric pain solid food dysphagia melena. Reports "lump in throat" which may be of relevance. I am not privy to her most recent CBC. Will attempt to obtain discharge summary to determine indication for EGD. Has referral to GI in the [facility] system.  *remote history of Left breast cancer with jessie and potentially bony metastatic involvement status post lumpectomy, unspecified chemo radiation, back in the 1990s. subsequent recurrences most recently 2019 treated with Herceptin via her Port-A-Cath, which she completed. Will complete 5-year exemestane February 2025.PET scan for some bone pain 2023 showed no evidence of neoplasm. She apparently no longer needs the Port-A-Cath. The Port-A-Cath requires flushing every 2 months, last flushed around August 1.  * GERD, chronically on pantoprazole without breakthrough symptoms solid food dysphagia or melena. She had an [id number].  *Hyperlipidemia. Chronically on simvastatin 40.  *3 drug hypertension. No known cardiovascular disease per patient's, but sees cardiology annually. She had echo and stress test in the past, apparently not recently.  *Left macular degeneration. Just saw her ophthalmologist August 2024. Never injected.  *Vitamin D deficiency. Chronically on 7711-1198 IU vitamin D3.Osteopenia on dexa 2024  *Right total knee arthroplasty, March 2024. Completed physical therapy. Doing well. Denies giveaway weakness or pain in her other joints.  * Routine adult health maintenance Vaccinations: Tdap Prevnar 20 Shingrix COVID flu indicated, obtain outside vaccination record from PCP  Colon cancer screening: Last colonoscopy around age 80. Denies personal history of adenomas or family history colorectal cancer Breast cancer Surveillance: see above Cervical cancer screen [place]: Aged out  osteoporosis screening: Osteopenia on DEXA 2024 vit D 43, 2024 Aged out of H CV HIV screening [id number]

## 2025-05-14 NOTE — HISTORY OF PRESENT ILLNESS
[FreeTextEntry1] : Follow up HTN eosinophilia [de-identified] : Most pleasant 85-year-old white female with past medical history significant for: *remote history of Left breast cancer with jessie and potentially bony metastatic involvement status post lumpectomy, unspecified chemo radiation, back in the 1990s. subsequent recurrences most recently 2019 treated with Herceptin via her Port-A-Cath, which she completed. Now on exemestane February 2025.PET scan for some bone pain 2023 showed no evidence of neoplasm.  * GERD, chronically on pantoprazole without breakthrough symptoms solid food dysphagia or melena. She had an [id number]. *Hyperlipidemia. Chronically on simvastatin 40. *3 drug hypertension. No known cardiovascular disease per patient's, but sees cardiology annually. She had echo and stress test in the past, recent TTE. *Left macular degeneration. ophthalmologist August 2024. Never injected. *Vitamin D deficiency. Chronically on 8775-2132 IU vitamin D3.Osteopenia on dexa 2024 *Right total knee arthroplasty, March 2024. Completed physical therapy. Doing well. Denies giveaway weakness or pain in her other joints.  ...who has been struggling with intermittent wheezing since pneumonia StFr early 3/2025, started on ICS/JOSEP seemingly improving as no longer wheezing not using inhalers consistently, last flare 3/31/25 medrol dosepak.  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 New Eosinophilia 3/2025, vs 8/2024, normal total IgE. AEC 0.6 March 5 ,1 March 27, 1.9 March 31.  We switched from amlodipine lisinopril to losartan, comes in for CBC BP check, MoCA. Asked to increase trazodone to 100mg hs for insomnia.   I have been increasingly concerned about her ability to well manage her meds. Reports using a pill box. At last visit, pill bottle review notable for a mix of diff tabs that my pill ID couldnt ID in her mobic bottle, which had a few mobic tabs left; and had apparently been taking 2.5 and 5 mg amlodipine after we increased amlodipine from 2.5 to 5mg 11/2024, and was unsure if was taking trazodone consistenlty in the setting of report of insomnia.  She did not increase the dose to 100 mg at bedtime as requested since last visit.  She lives with her daughter.  She is not driving.  She is otherwise appropriately groomed and dressed.  No substance abuse.

## 2025-05-14 NOTE — HISTORY OF PRESENT ILLNESS
[FreeTextEntry1] : Follow up HTN eosinophilia [de-identified] : Most pleasant 85-year-old white female with past medical history significant for: *remote history of Left breast cancer with jessie and potentially bony metastatic involvement status post lumpectomy, unspecified chemo radiation, back in the 1990s. subsequent recurrences most recently 2019 treated with Herceptin via her Port-A-Cath, which she completed. Now on exemestane February 2025.PET scan for some bone pain 2023 showed no evidence of neoplasm.  * GERD, chronically on pantoprazole without breakthrough symptoms solid food dysphagia or melena. She had an [id number]. *Hyperlipidemia. Chronically on simvastatin 40. *3 drug hypertension. No known cardiovascular disease per patient's, but sees cardiology annually. She had echo and stress test in the past, recent TTE. *Left macular degeneration. ophthalmologist August 2024. Never injected. *Vitamin D deficiency. Chronically on 7734-8194 IU vitamin D3.Osteopenia on dexa 2024 *Right total knee arthroplasty, March 2024. Completed physical therapy. Doing well. Denies giveaway weakness or pain in her other joints.  ...who has been struggling with intermittent wheezing since pneumonia StFr early 3/2025, started on ICS/JOSEP seemingly improving as no longer wheezing not using inhalers consistently, last flare 3/31/25 medrol dosepak.  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 New Eosinophilia 3/2025, vs 8/2024, normal total IgE. AEC 0.6 March 5 ,1 March 27, 1.9 March 31.  We switched from amlodipine lisinopril to losartan, comes in for CBC BP check, MoCA. Asked to increase trazodone to 100mg hs for insomnia.   I have been increasingly concerned about her ability to well manage her meds. Reports using a pill box. At last visit, pill bottle review notable for a mix of diff tabs that my pill ID couldnt ID in her mobic bottle, which had a few mobic tabs left; and had apparently been taking 2.5 and 5 mg amlodipine after we increased amlodipine from 2.5 to 5mg 11/2024, and was unsure if was taking trazodone consistenlty in the setting of report of insomnia.  She did not increase the dose to 100 mg at bedtime as requested since last visit.  She lives with her daughter.  She is not driving.  She is otherwise appropriately groomed and dressed.  No substance abuse.

## 2025-05-16 NOTE — ASSESSMENT
[FreeTextEntry1] : Assessment: Henrietta Dudley is an 86 year old woman with past medical history of Breast cancer (s/p left mastectomy and chemotherapy, on Exemestane), Hypertension, Hyperlipidemia & GERD with prior hospitalization at [facility] in March for pneumonia, now presents to re-establish care in this cardiovascular office.   Since her hospital discharge the patient reports that she has followed with the pulmonologist. She has experienced shortness of breath on exertion, she denies exertional chest pain. ECG today consistent with sinus rhythm, low voltage and nonspecific ST abnormalities. Echocardiogram (3/2025) consistent with normal LVEF 60-65%, basal inferior wall mild hypokinesis and mild to moderate aortic valve stenosis.   Recommendations: [] Mild to moderate aortic valve stenosis: Discussed pathophysiology of this condition to patient with diagrams. She has no signs of congestive heart failure, syncope or angina at this time. Explained to patient that if stenosis progresses she may require TAVR, in meantime recommend optimal control of CV risk factors as indicated below and close monitoring of symptoms. [] Dyspnea on exertion, RWMA: Echo with wall motion abnormality, patient was planned to have coronary CT angiogram however was hospitalized for pneumonia which has now resolved. CCTA re-ordered to evaluate for obstructive CAD. Will start Aspirin 81 mg daily. Likely large component of symptoms due to asthma vs COPD per Pulmonary. [] Hypertension: BP appears stable on current medical therapy, continue Carvedilol 12.5 mg BID and Losartan 100 mg daily. Recommend DASH diet plan.  [] Hyperlipidemia: LDL well controlled at 83 mg/dl, continue Simvastatin 40 mg daily [] Return to office: 1 month or sooner if needed

## 2025-05-16 NOTE — REVIEW OF SYSTEMS
[Dyspnea on exertion] : dyspnea during exertion [Headache] : no headache [Blurry Vision] : no blurred vision [Chest Discomfort] : no chest discomfort [Lower Ext Edema] : no extremity edema [Palpitations] : no palpitations [Syncope] : no syncope [Abdominal Pain] : no abdominal pain [Rash] : no rash [Dizziness] : no dizziness [Easy Bruising] : no tendency for easy bruising

## 2025-05-16 NOTE — HISTORY OF PRESENT ILLNESS
[FreeTextEntry1] : Henrietta Dudley is an 86 year old woman with past medical history of Breast cancer (s/p left mastectomy and chemotherapy, on Exemestane), Hypertension, Hyperlipidemia & GERD with prior hospitalization at [facility] in March for pneumonia, now presents to re-establish care in this cardiovascular office.   Since her hospital discharge the patient reports that she has followed with the pulmonologist. She has experienced shortness of breath on exertion, she denies exertional chest pain. Denies leg swelling or palpitations.

## 2025-05-16 NOTE — CARDIOLOGY SUMMARY
[de-identified] : ECG (9/12/24): sinus bradycardia, diffuse low voltage, nonspecific ST abnormalities ECG (2/14/25): sinus bradycardia, borderline inferior Q waves ECG (5/16/25): normal sinus rhythm, low voltage, nonspecific ST flattening [de-identified] : TTE (9/2024): LVEF 55-60%. Normal RV size and function. Moderate posterior MAC. Mild MR. AV not well visualized, appears calcified with decreased opening. Mild AR. No pericardial effusion.  TTE (3/2025): LVEF 60-65%, basal inferior wall mild hypokinesis. Normal RV size and function. Mild MR. Mild-moderate aortic valve stenosis. Mild AR. No pericardial effusion.

## 2025-05-16 NOTE — PHYSICAL EXAM
[Normal Conjunctiva] : normal conjunctiva [Normal Gait] : normal gait [No Edema] : no edema [Normal] : alert and oriented, normal memory [de-identified] : well appearing [de-identified] : supple, no carotid bruits b/l [de-identified] : JVP ~ 7 cm H20, RRR, s1, s2, 3/6 systolic murmur [de-identified] : unlabored respirations, clear lung fields [de-identified] : non-distended

## 2025-05-16 NOTE — CARDIOLOGY SUMMARY
[de-identified] : ECG (9/12/24): sinus bradycardia, diffuse low voltage, nonspecific ST abnormalities ECG (2/14/25): sinus bradycardia, borderline inferior Q waves ECG (5/16/25): normal sinus rhythm, low voltage, nonspecific ST flattening [de-identified] : TTE (9/2024): LVEF 55-60%. Normal RV size and function. Moderate posterior MAC. Mild MR. AV not well visualized, appears calcified with decreased opening. Mild AR. No pericardial effusion.  TTE (3/2025): LVEF 60-65%, basal inferior wall mild hypokinesis. Normal RV size and function. Mild MR. Mild-moderate aortic valve stenosis. Mild AR. No pericardial effusion.

## 2025-05-16 NOTE — PHYSICAL EXAM
[Normal Conjunctiva] : normal conjunctiva [Normal Gait] : normal gait [No Edema] : no edema [Normal] : alert and oriented, normal memory [de-identified] : well appearing [de-identified] : supple, no carotid bruits b/l [de-identified] : JVP ~ 7 cm H20, RRR, s1, s2, 3/6 systolic murmur [de-identified] : unlabored respirations, clear lung fields [de-identified] : non-distended

## 2025-05-16 NOTE — HISTORY OF PRESENT ILLNESS
[FreeTextEntry1] : Henrietta Dudley is an 86 year old woman with past medical history of Breast cancer (s/p left mastectomy and chemotherapy, on Exemestane), Hypertension, Hyperlipidemia & GERD with prior hospitalization at [facility] in March for pneumonia, now presents to re-establish care in this cardiovascular office.   Since her hospital discharge the patient reports that she has followed with the pulmonologist. She has experienced shortness of breath on exertion, she denies exertional chest pain. Denies leg swelling or palpitations.

## 2025-05-16 NOTE — ASSESSMENT
[FreeTextEntry1] : Assessment: Henrietta Dudley is an 86 year old woman with past medical history of Breast cancer (s/p left mastectomy and chemotherapy, on Exemestane), Hypertension, Hyperlipidemia & GERD with prior hospitalization at [facility] in March for pneumonia, now presents to re-establish care in this cardiovascular office.   Since her hospital discharge the patient reports that she has followed with the pulmonologist. She has experienced shortness of breath on exertion, she denies exertional chest pain. ECG today consistent with sinus rhythm, low voltage and nonspecific ST abnormalities. Echocardiogram (3/2025) consistent with normal LVEF 60-65%, basal inferior wall mild hypokinesis and mild to moderate aortic valve stenosis.   Recommendations: [] Mild to moderate aortic valve stenosis: Discussed pathophysiology of this condition to patient with diagrams. She has no signs of congestive heart failure, syncope or angina at this time. Explained to patient that if stenosis progresses she may require TAVR, in meantime recommend optimal control of CV risk factors as indicated below and close monitoring of symptoms. [] Dyspnea on exertion, RWMA: Echo with wall motion abnormality, patient was planned to have coronary CT angiogram however was hospitalized for pneumonia which has now resolved. CCTA re-ordered to evaluate for obstructive CAD. Will start Aspirin 81 mg daily. Likely large component of symptoms due to asthma vs COPD per Pulmonary. [] Hypertension: BP appears stable on current medical therapy, continue Carvedilol 12.5 mg BID and Losartan 100 mg daily. Recommend DASH diet plan.  [] Hyperlipidemia: LDL well controlled at 83 mg/dl, continue Simvastatin 40 mg daily [] Return to office: 1 month or sooner if needed

## 2025-05-21 NOTE — HISTORY OF PRESENT ILLNESS
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  -  h/o  left breast cancer   s/p chemo  RT  then recurrence   2019    in remission   port-a cath  ,  recent move from [place] to [place] and now    here for  asthma - started  with wheezing  told   asthma one month ago  - has albuterol   --poor technique noted today  - cxr clear  - denies   covid  - one month  ago   -   last pneumonia  left   lung   [facility]  - wheezing   now   daily  - last night some cough  -     nighttime's  rug no pets-  -never had lung issues nor  seen by pulm  in past -  5/21/2025 85y/o female born in [place] ex smoker ( started 1/2 PPD 17y/o- 46 y/o second hand smoke from her  ) 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

## 2025-05-21 NOTE — PROCEDURE
[FreeTextEntry1] : 86y/o female ex smoker BMI 23.41 kg/m2   PFT 5/12/2025 Study limited by short expiratory time spirometry suggest reduced FVC which is questionable poor effort reduced FEV1 69.2 normal FEV1/FVC ( questionable due to poor expiratory time,  fef 25-75% is normal  Lung volumes by N2 washout suggest normal TLC DLCO is normal DLCO/va is reduced  Impression Likely poor study due to short expiratory time Suggestive of mild obstructive airflow pattern Normal TLC normal DLCO reduced DLCO/Va recommend clinical correlation     4/9/2025  albuterol via neb  face mask  x one  tolerated and discussion on  proper neb use and medications

## 2025-05-21 NOTE — ASSESSMENT
[FreeTextEntry1] : 85y/o   female   1-   eosinophilic  asthma  vs COPD ( ex-smoker and second hand smoke)      allergies  allergic rhinitis 2- h/o  left breast cancer  chemo RT to left  with recurrence now remission  3- seen by allergist  Dr Mackey work up negative  4-  vaccination per primary   recommendations 1-     nebulizer re-ordered     2-   has libby  heme  onc  jeovany 9  3- PFT 5/2026     4- advair   bid  rinse after use  renewed  5- home care  prevention HEPA air filters  change linen x 2 week  vacuum rug    reviewed  PFT   labs and meds stable  -

## 2025-05-21 NOTE — PROCEDURE
[FreeTextEntry1] : 84y/o female ex smoker BMI 23.41 kg/m2   PFT 5/12/2025 Study limited by short expiratory time spirometry suggest reduced FVC which is questionable poor effort reduced FEV1 69.2 normal FEV1/FVC ( questionable due to poor expiratory time,  fef 25-75% is normal  Lung volumes by N2 washout suggest normal TLC DLCO is normal DLCO/va is reduced  Impression Likely poor study due to short expiratory time Suggestive of mild obstructive airflow pattern Normal TLC normal DLCO reduced DLCO/Va recommend clinical correlation     4/9/2025  albuterol via neb  face mask  x one  tolerated and discussion on  proper neb use and medications

## 2025-05-21 NOTE — ASSESSMENT
[FreeTextEntry1] : 87y/o   female   1-   eosinophilic  asthma  vs COPD ( ex-smoker and second hand smoke)      allergies  allergic rhinitis 2- h/o  left breast cancer  chemo RT to left  with recurrence now remission  3- seen by allergist  Dr Mackey work up negative  4-  vaccination per primary   recommendations 1-     nebulizer re-ordered     2-   has libby  heme  onc  jeovany 9  3- PFT 5/2026     4- advair   bid  rinse after use  renewed  5- home care  prevention HEPA air filters  change linen x 2 week  vacuum rug    reviewed  PFT   labs and meds stable  -

## 2025-05-21 NOTE — PHYSICAL EXAM
Looking to get tested for Covid-19. States she saw a phone number for [facility]. States she has symptoms. Asking how far out testing is. Explained process and referred to [email or web address] for Covid-19 testing and screening. Patient verbalized understanding and had no further questions.    Karen Santiago RN/[facility]    COVID 19 Nurse Triage Plan/Patient Instructions    Please be aware that novel coronavirus (COVID-19) may be circulating in the community. If you develop symptoms such as fever, cough, or SOB or if you have concerns about the presence of another infection including coronavirus (COVID-19), please contact your health care provider or visit [email or web address].     Disposition/Instructions    Virtual Visit with provider recommended. Reference Visit Selection Guide.    Thank you for taking steps to prevent the spread of this virus.  o Limit your contact with others.  o Wear a simple mask to cover your cough.  o Wash your hands well and often.    Resources    [facility]: About COVID-19: [email or web address]    CDC: What to Do If You're Sick: www.cdc.gov/coronavirus/2019-ncov/about/steps-when-sick.html    CDC: Ending Home Isolation: www.cdc.gov/coronavirus/2019-ncov/hcp/disposition-in-home-patients.html     CDC: Caring for Someone: www.cdc.gov/coronavirus/2019-ncov/if-you-are-sick/care-for-someone.html     [facility]: Interim Guidance for Hospital Discharge to Home: www.health.[facility].mn.us/diseases/coronavirus/hcp/hospdischarge.pdf    [facility] ER clinical trials (COVID-19 research studies): [email or web address]     Below are the COVID-19 hotlines at the [facility] ([facility]). Interpreters are available.   o For health questions: Call [phone] or [phone] (7 a.m. to 7 p.m.)  o For questions about schools and childcare: Call [phone] or [phone] (7 a.m. to 7 p.m.)       Additional Information    Negative: [1]  Caller is not with the adult (patient) AND [2] reporting urgent symptoms    Negative: Lab result questions    Negative: Medication questions    Negative: Caller can't be reached by phone    Negative: Caller has already spoken to PCP or another triager    Negative: RN needs further essential information from caller in order to complete triage    Negative: Requesting regular office appointment    Negative: [1] Caller requesting NON-URGENT health information AND [2] PCP's office is the best resource    Health Information question, no triage required and triager able to answer question    Protocols used: INFORMATION ONLY CALL-A-AH       [No Acute Distress] : no acute distress [No Deformities] : no deformities [IV] : Mallampati Class: IV [Normal Appearance] : normal appearance [Supple] : supple [No Neck Mass] : no neck mass [No JVD] : no jvd [Normal Rate/Rhythm] : normal rate/rhythm [Normal S1, S2] : normal s1, s2 [No Murmurs] : no murmurs [No Resp Distress] : no resp distress [No Acc Muscle Use] : no acc muscle use [Clear to Auscultation Bilaterally] : clear to auscultation bilaterally [Kyphosis] : kyphosis [Benign] : benign [Not Tender] : not tender [No HSM] : no hsm [No Hernias] : no hernias [Normal Gait] : normal gait [No Clubbing] : no clubbing [No Edema] : no edema [No Rash] : no rash [No Motor Deficits] : no motor deficits [Normal Affect] : normal affect [TextBox_2] : pleasant f no distress no cough nosob  [TextBox_11] : no lesion  moist

## 2025-05-21 NOTE — HISTORY OF PRESENT ILLNESS
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  -  h/o  left breast cancer   s/p chemo  RT  then recurrence   2019    in remission   port-a cath  ,  recent move from [place] to [place] and now    here for  asthma - started  with wheezing  told   asthma one month ago  - has albuterol   --poor technique noted today  - cxr clear  - denies   covid  - one month  ago   -   last pneumonia  left   lung   [facility]  - wheezing   now   daily  - last night some cough  -     nighttime's  rug no pets-  -never had lung issues nor  seen by pulm  in past -  5/21/2025 85y/o female born in [place] ex smoker ( started 1/2 PPD 15y/o- 46 y/o second hand smoke from her  ) 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

## 2025-05-21 NOTE — PHYSICAL EXAM
[No Acute Distress] : no acute distress [No Deformities] : no deformities [IV] : Mallampati Class: IV [Normal Appearance] : normal appearance [Supple] : supple [No Neck Mass] : no neck mass [No JVD] : no jvd [Normal Rate/Rhythm] : normal rate/rhythm [Normal S1, S2] : normal s1, s2 [No Murmurs] : no murmurs [No Resp Distress] : no resp distress [No Acc Muscle Use] : no acc muscle use [Clear to Auscultation Bilaterally] : clear to auscultation bilaterally [Kyphosis] : kyphosis [Benign] : benign [Not Tender] : not tender [No HSM] : no hsm [No Hernias] : no hernias [Normal Gait] : normal gait [No Clubbing] : no clubbing [No Edema] : no edema [No Rash] : no rash [No Motor Deficits] : no motor deficits [Normal Affect] : normal affect [TextBox_2] : pleasant f no distress no cough nosob  [TextBox_11] : no lesion  moist

## 2025-06-03 NOTE — HISTORY OF PRESENT ILLNESS
[de-identified] :                                               2023   2024   3/2025   5/2025 Absolute eosinophil count     340    340     990        200

## 2025-07-03 NOTE — HISTORY OF PRESENT ILLNESS
[FreeTextEntry1] : Henrietta Dudley is an 86 year old woman with past medical history of Breast cancer (s/p left mastectomy and chemotherapy, on Exemestane), Hypertension, Hyperlipidemia, Mild to moderate aortic valve stenosis & GERD with prior hospitalization at [facility] in March for pneumonia and recently diagnosed Coronary artery disease on CCTA, presents for follow up visit.  The patient reports that she has had episodes of shortness of breath and cough and attributes it to asthma. She denies exertional chest pain. She admits to not taking Aspirin.

## 2025-07-03 NOTE — HISTORY OF PRESENT ILLNESS
[FreeTextEntry1] : Henrietta Dudley is an 86 year old woman with past medical history of Breast cancer (s/p left mastectomy and chemotherapy, on Exemestane), Hypertension, Hyperlipidemia, Mild to moderate aortic valve stenosis & GERD with prior hospitalization at [facility] in March for pneumonia and recently diagnosed Coronary artery disease on CCTA, presents for follow up visit.  The patient reports that she has had episodes of shortness of breath and cough and attributes it to asthma. She denies exertional chest pain. She admits to not taking Aspirin.

## 2025-07-03 NOTE — REVIEW OF SYSTEMS
[Dyspnea on exertion] : dyspnea during exertion [Cough] : cough [Headache] : no headache [Blurry Vision] : no blurred vision [Chest Discomfort] : no chest discomfort [Lower Ext Edema] : no extremity edema [Palpitations] : no palpitations [Syncope] : no syncope [Abdominal Pain] : no abdominal pain [Rash] : no rash [Dizziness] : no dizziness [Easy Bruising] : no tendency for easy bruising

## 2025-07-03 NOTE — CARDIOLOGY SUMMARY
[de-identified] : ECG (9/12/24): sinus bradycardia, diffuse low voltage, nonspecific ST abnormalities ECG (2/14/25): sinus bradycardia, borderline inferior Q waves ECG (5/16/25): normal sinus rhythm, low voltage, nonspecific ST flattening ECG (7/2/25): sinus bradycardia [de-identified] : TTE (9/2024): LVEF 55-60%. Normal RV size and function. Moderate posterior MAC. Mild MR. AV not well visualized, appears calcified with decreased opening. Mild AR. No pericardial effusion.  TTE (3/2025): LVEF 60-65%, basal inferior wall mild hypokinesis. Normal RV size and function. Mild MR. Mild-moderate aortic valve stenosis. Mild AR. No pericardial effusion. [de-identified] : Coronary CT angiogram (5/2025): Calcium score 1736. Multivessel obstructive CAD. LM normal. LAD mid severe narrowing. D1 moderate narrowing. OM normal. RCA mid severe luminal narrowing.

## 2025-07-03 NOTE — PHYSICAL EXAM
[Normal Conjunctiva] : normal conjunctiva [Normal Gait] : normal gait [No Edema] : no edema [Normal] : alert and oriented, normal memory [de-identified] : well appearing [de-identified] : supple, no carotid bruits b/l [de-identified] : JVP ~ 7 cm H20, RRR, s1, s2, 3/6 systolic murmur [de-identified] : unlabored respirations, clear lung fields [de-identified] : non-distended

## 2025-07-03 NOTE — PHYSICAL EXAM
[Normal Conjunctiva] : normal conjunctiva [Normal Gait] : normal gait [No Edema] : no edema [Normal] : alert and oriented, normal memory [de-identified] : well appearing [de-identified] : supple, no carotid bruits b/l [de-identified] : JVP ~ 7 cm H20, RRR, s1, s2, 3/6 systolic murmur [de-identified] : unlabored respirations, clear lung fields [de-identified] : non-distended

## 2025-07-03 NOTE — ASSESSMENT
[FreeTextEntry1] : Assessment: Henrietta Dudley is an 86 year old woman with past medical history of Breast cancer (s/p left mastectomy and chemotherapy, on Exemestane), Hypertension, Hyperlipidemia, Mild to moderate aortic valve stenosis & GERD with prior hospitalization at [facility] in March for pneumonia and recently diagnosed Coronary artery disease on CCTA, presents for follow up visit.  The patient reports that she has had episodes of shortness of breath and cough and attributes it to asthma. She denies exertional chest pain. ECG consistent with sinus bradycardia.  Echocardiogram (3/2025) consistent with normal LVEF 60-65%, basal inferior wall mild hypokinesis and mild to moderate aortic valve stenosis. Coronary CT angiogram (5/2025) consistent with severely elevated calcium score 1736 with severe narrowing of mid LAD and RCA, moderate narrowing of D1, overall multivessel obstructive CAD.   Recommendations: [] CAD: Appears obstructive on CCTA and echo revealed prior basal inferior wall mild hypokinesis. It has been very difficult to reach this patient via phone numbers in chart since CCTA resulted in May, however a letter was sent to her home and she presented today. Discussed recommendation for coronary angiogram to evaluate if PCI needed, risks/benefits/alternatives were discussed with patient and she agrees to proceed, will arrange for angiogram (renal function stable). Recommended patient to resume Aspirin 81 mg daily. Continue Carvedilol 12.5 mg BID, Losartan 100 mg daily and Simvastatin 40 mg daily (will likely switch to high intensity statin).  [] Mild to moderate aortic valve stenosis: Discussed pathophysiology of this condition to patient with diagrams. She has no signs of congestive heart failure, syncope or angina at this time. Explained to patient that if stenosis progresses she may require TAVR, in meantime recommend optimal control of CV risk factors and close monitoring of symptoms. [] Hypertension: BP appears stable on current medical therapy, continue Carvedilol 12.5 mg BID and Losartan 100 mg daily. Recommend DASH diet plan.  [] Return to office: 1 month or sooner if needed

## 2025-07-03 NOTE — ASSESSMENT
[FreeTextEntry1] : Assessment: Henrietta Dudley is an 86 year old woman with past medical history of Breast cancer (s/p left mastectomy and chemotherapy, on Exemestane), Hypertension, Hyperlipidemia, Mild to moderate aortic valve stenosis & GERD with prior hospitalization at [facility] in March for pneumonia and recently diagnosed Coronary artery disease on CCTA, presents for follow up visit.  The patient reports that she has had episodes of shortness of breath and cough and attributes it to asthma. She denies exertional chest pain. ECG consistent with sinus bradycardia.  Echocardiogram (3/2025) consistent with normal LVEF 60-65%, basal inferior wall mild hypokinesis and mild to moderate aortic valve stenosis. Coronary CT angiogram (5/2025) consistent with severely elevated calcium score 1736 with severe narrowing of mid LAD and RCA, moderate narrowing of D1, overall multivessel obstructive CAD.   Recommendations: [] CAD: Appears obstructive on CCTA and echo revealed prior basal inferior wall mild hypokinesis. It has been very difficult to reach this patient via phone numbers in chart since CCTA resulted in May, however a letter was sent to her home and she presented today. Discussed recommendation for coronary angiogram to evaluate if PCI needed, risks/benefits/alternatives were discussed with patient and she agrees to proceed, will arrange for angiogram (renal function stable). Recommended patient to resume Aspirin 81 mg daily. Continue Carvedilol 12.5 mg BID, Losartan 100 mg daily and Simvastatin 40 mg daily (will likely switch to high intensity statin).  [] Mild to moderate aortic valve stenosis: Discussed pathophysiology of this condition to patient with diagrams. She has no signs of congestive heart failure, syncope or angina at this time. Explained to patient that if stenosis progresses she may require TAVR, in meantime recommend optimal control of CV risk factors and close monitoring of symptoms. [] Hypertension: BP appears stable on current medical therapy, continue Carvedilol 12.5 mg BID and Losartan 100 mg daily. Recommend DASH diet plan.  [] Return to office: 1 month or sooner if needed

## 2025-07-03 NOTE — CARDIOLOGY SUMMARY
[de-identified] : ECG (9/12/24): sinus bradycardia, diffuse low voltage, nonspecific ST abnormalities ECG (2/14/25): sinus bradycardia, borderline inferior Q waves ECG (5/16/25): normal sinus rhythm, low voltage, nonspecific ST flattening ECG (7/2/25): sinus bradycardia [de-identified] : TTE (9/2024): LVEF 55-60%. Normal RV size and function. Moderate posterior MAC. Mild MR. AV not well visualized, appears calcified with decreased opening. Mild AR. No pericardial effusion.  TTE (3/2025): LVEF 60-65%, basal inferior wall mild hypokinesis. Normal RV size and function. Mild MR. Mild-moderate aortic valve stenosis. Mild AR. No pericardial effusion. [de-identified] : Coronary CT angiogram (5/2025): Calcium score 1736. Multivessel obstructive CAD. LM normal. LAD mid severe narrowing. D1 moderate narrowing. OM normal. RCA mid severe luminal narrowing.

## 2025-07-08 NOTE — PHYSICAL EXAM
[Normal] : no acute distress, well nourished, well developed and well-appearing [de-identified] : Occasionally vague historian.  When asked for Tonya's phone number, the number entered into her cell phone was area code, then number*number, then for more numbers.  In other words an incomplete phone number

## 2025-07-08 NOTE — ASSESSMENT
[FreeTextEntry1] : *Anxiety. Insomnia. May be dementia driven. Buspar without benefit, but uncertain if pt tried. TSH normal. Trial trazodone 50->100mg hs-> 150mg->200mg, 3 nights at each level till determines efficacious dose or daytime hangover will require family engagement.  She asks for anxiety medicine, buspar re prescribed.  *Dementia, moderate. MoCA 17/30 hs graduate. No movement disorder, hallucinations, CVA history. Small vessel disease on hCT 8/2024. La milady indiference demeanor. Most likely patient has early Alzheimer's disease. Some time ago, spoke with her daughter Tonya (they live together), asked her to oversee her mother setting up her medication and oversee compliance, pt states daughter not involved. May need to do blister packs.  Last month trial aricept hs, counseled on poss GI s/e, AFTER 7/1/25 when has hopefully stabilized trazodone dosing. Pt unsure if has started yet, in theory should not have given trazodone uncertainty.  Sent letter to pt requesting her daughter contact our office.  We need to be able to contact the patient or preferably daughter reliably, via phone or portal, have the daughter oversee medication management, and discuss restricting patient driving. The challenges in being able to engage family have impacted patient care to wit delaying trazodone trial Aricept trial coronary angiography.  Patient may soon qualify for vulnerable adult status.  *HTN. Seemingly adequate control on losartan & coreg.  *Hyperlipidemia. Tolerating statin with good control.LDL 83 2024.  *Possibly symptomatic two-vessel coronary artery disease.CT angio heart coronary with IV contrast May 2025Agatston total 1736, 663 in the LAD 1073 in the RCA. Cardiology plans to take pt to cath lab this month.  *Abnormal chest CT. Will attempt to obtain the report and discharge summary, plan follow-up chest CT by May 1st.  *Outside indication of need for EGD. Per pt discharge instructions from early 2025 pneumonia hosptitalization. Patient denies epigastric pain solid food dysphagia melena. Reports "lump in throat" which may be of relevance. Had referral to GI in the [facility] system, claims to have never seen.  *remote history of Left breast cancer with jessie and potentially bony metastatic involvement status post lumpectomy, unspecified chemo radiation, back in the 1990s. subsequent recurrences most recently 2019 treated with Herceptin via her Port-A-Cath, which she completed. Will complete 5-year exemestane February 2025.PET scan for some bone pain 2023 showed no evidence of neoplasm. She apparently no longer needs the Port-A-Cath. The Port-A-Cath requires flushing every 2 months, last flushed around August 1.  * GERD, chronically on pantoprazole without breakthrough symptoms solid food dysphagia or melena. She had an [id number].  *Hyperlipidemia. Chronically on simvastatin 40.  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 No known cardiovascular disease per patient's, but sees cardiology annually. She had echo and stress test in the past, apparently not recently.  *Left macular degeneration. Just saw her ophthalmologist August 2024. Never injected.  *Vitamin D deficiency. Chronically on 4306-6714 IU vitamin D3.Osteopenia on dexa 2024  *Right total knee arthroplasty, March 2024. Completed physical therapy. Doing well. Denies giveaway weakness or pain in her other joints.  * Routine adult health maintenance Vaccinations: Tdap Prevnar 20 Shingrix COVID flu indicated, obtain outside vaccination record from PCP Colon cancer screening: Last colonoscopy around age 80. Denies personal history of adenomas or family history colorectal cancer Breast cancer Surveillance: see above Cervical cancer screen Green[place]: Aged out  osteoporosis screening: Osteopenia on DEXA 2024 vit D 43, 2024 Aged out of H CV HIV screening GOALS: coronary angiography, chest CT initial report from [facility], and organize follow up CT, trazodone then aricept trial, engage daughter Tonya in patient  care. [id number]

## 2025-07-08 NOTE — HISTORY OF PRESENT ILLNESS
[FreeTextEntry1] : [place] Fatigue [de-identified] : Most pleasant 85-year-old white female with past medical history significant for: *probably early Alzheimers MoCA 5/2025 17/30.  Prescribed Aricept June 2025, patient not sure if she is taking.  Daughter does not accompany her today, and daughter not reachable by phone today.  Letter sent. *remote history of Left breast cancer with jessie and potentially bony metastatic involvement status post lumpectomy, unspecified chemo radiation, back in the 1990s. subsequent recurrences most recently 2019 treated with Herceptin via her Port-A-Cath, which she completed. Now on exemestane I believe.PET scan for some bone pain 2023 showed no evidence of neoplasm.  * GERD, chronically on pantoprazole without breakthrough symptoms solid food dysphagia or melena. She had an [id number]. *Hyperlipidemia. Chronically on simvastatin 40, may soon change *2 drug hypertension.Asymptomatic CAD 2v, med managed, sees cardiology annually. She had echo and stress test in the past, 2025 TTE LVEF 60-65%, mild MR but cardiac CT Ca++ scan suspicious for high grade stenosis. *Left macular degeneration. ophthalmologist August 2024. Never injected. *Vitamin D deficiency. Chronically on 7948-1463 IU vitamin D3.Osteopenia on dexa 2024 *Right total knee arthroplasty, March 2024. Completed physical therapy. Doing well. Denies giveaway weakness or pain in her other joints. *COPD on advair (pulmonology 4/9/25 who obtained negative [place] respiratory allergen profile but positive egg allergy on the food allergy profile, PFTs symmetric FEV1/FVC ) *Abnormal coronary scan May 2025.  Upcoming angiogram.  Patient experiences intermittent HARPER. *Insomnia.  Had wanted to do uptitratation trial for trazodone, however patient cognitively unable and daughter Tonya has been difficult to reach.  I have been increasingly concerned about her ability to well manage her meds. Reports using a pill box. At 4/28/25 visit, pill bottle review notable for a mix of diff tabs that my pill ID couldnt ID in her mobic bottle, which had a few mobic tabs left; and had apparently been taking 2.5 and 5 mg amlodipine after we increased amlodipine from 2.5 to 5mg 11/2024, and was unsure if was taking trazodone consistenlty in the setting of report of insomnia.  She did not increase the dose to 100 mg at bedtime as requested over last 2 visits, yet rpts 4hrs sleep/night is too little.   She lives with her daughter.  She is driving during the day.  She is otherwise appropriately groomed and dressed.  No substance abuse.  She had a panic attack driving home from [place], was taken by ambulance to local ER in [place] where she was given something for anxiety apparently.  Onset of her panic attack corresponded to her dropping her daughter off in [place] and then being faced with having to drive herself home to [place].  Of note, unclear if this was angina but seemingly occurred at rest.  We have had difficulty reaching pt and daughter by phone, to wit, cardiac scan 5/2025 only able to see Dr Claudio this month after letter sent.

## 2025-07-08 NOTE — HISTORY OF PRESENT ILLNESS
[FreeTextEntry1] : [place] Fatigue [de-identified] : Most pleasant 85-year-old white female with past medical history significant for: *probably early Alzheimers MoCA 5/2025 17/30.  Prescribed Aricept June 2025, patient not sure if she is taking.  Daughter does not accompany her today, and daughter not reachable by phone today.  Letter sent. *remote history of Left breast cancer with jessie and potentially bony metastatic involvement status post lumpectomy, unspecified chemo radiation, back in the 1990s. subsequent recurrences most recently 2019 treated with Herceptin via her Port-A-Cath, which she completed. Now on exemestane I believe.PET scan for some bone pain 2023 showed no evidence of neoplasm.  * GERD, chronically on pantoprazole without breakthrough symptoms solid food dysphagia or melena. She had an [id number]. *Hyperlipidemia. Chronically on simvastatin 40, may soon change *2 drug hypertension.Asymptomatic CAD 2v, med managed, sees cardiology annually. She had echo and stress test in the past, 2025 TTE LVEF 60-65%, mild MR but cardiac CT Ca++ scan suspicious for high grade stenosis. *Left macular degeneration. ophthalmologist August 2024. Never injected. *Vitamin D deficiency. Chronically on 9398-7355 IU vitamin D3.Osteopenia on dexa 2024 *Right total knee arthroplasty, March 2024. Completed physical therapy. Doing well. Denies giveaway weakness or pain in her other joints. *COPD on advair (pulmonology 4/9/25 who obtained negative [place] respiratory allergen profile but positive egg allergy on the food allergy profile, PFTs symmetric FEV1/FVC ) *Abnormal coronary scan May 2025.  Upcoming angiogram.  Patient experiences intermittent HARPER. *Insomnia.  Had wanted to do uptitratation trial for trazodone, however patient cognitively unable and daughter Tonya has been difficult to reach.  I have been increasingly concerned about her ability to well manage her meds. Reports using a pill box. At 4/28/25 visit, pill bottle review notable for a mix of diff tabs that my pill ID couldnt ID in her mobic bottle, which had a few mobic tabs left; and had apparently been taking 2.5 and 5 mg amlodipine after we increased amlodipine from 2.5 to 5mg 11/2024, and was unsure if was taking trazodone consistenlty in the setting of report of insomnia.  She did not increase the dose to 100 mg at bedtime as requested over last 2 visits, yet rpts 4hrs sleep/night is too little.   She lives with her daughter.  She is driving during the day.  She is otherwise appropriately groomed and dressed.  No substance abuse.  She had a panic attack driving home from [place], was taken by ambulance to local ER in [place] where she was given something for anxiety apparently.  Onset of her panic attack corresponded to her dropping her daughter off in [place] and then being faced with having to drive herself home to [place].  Of note, unclear if this was angina but seemingly occurred at rest.  We have had difficulty reaching pt and daughter by phone, to wit, cardiac scan 5/2025 only able to see Dr Claudio this month after letter sent.

## 2025-07-08 NOTE — ASSESSMENT
[FreeTextEntry1] : *Anxiety. Insomnia. May be dementia driven. Buspar without benefit, but uncertain if pt tried. TSH normal. Trial trazodone 50->100mg hs-> 150mg->200mg, 3 nights at each level till determines efficacious dose or daytime hangover will require family engagement.  She asks for anxiety medicine, buspar re prescribed.  *Dementia, moderate. MoCA 17/30 hs graduate. No movement disorder, hallucinations, CVA history. Small vessel disease on hCT 8/2024. La milady indiference demeanor. Most likely patient has early Alzheimer's disease. Some time ago, spoke with her daughter Tonya (they live together), asked her to oversee her mother setting up her medication and oversee compliance, pt states daughter not involved. May need to do blister packs.  Last month trial aricept hs, counseled on poss GI s/e, AFTER 7/1/25 when has hopefully stabilized trazodone dosing. Pt unsure if has started yet, in theory should not have given trazodone uncertainty.  Sent letter to pt requesting her daughter contact our office.  We need to be able to contact the patient or preferably daughter reliably, via phone or portal, have the daughter oversee medication management, and discuss restricting patient driving. The challenges in being able to engage family have impacted patient care to wit delaying trazodone trial Aricept trial coronary angiography.  Patient may soon qualify for vulnerable adult status.  *HTN. Seemingly adequate control on losartan & coreg.  *Hyperlipidemia. Tolerating statin with good control.LDL 83 2024.  *Possibly symptomatic two-vessel coronary artery disease.CT angio heart coronary with IV contrast May 2025Agatston total 1736, 663 in the LAD 1073 in the RCA. Cardiology plans to take pt to cath lab this month.  *Abnormal chest CT. Will attempt to obtain the report and discharge summary, plan follow-up chest CT by May 1st.  *Outside indication of need for EGD. Per pt discharge instructions from early 2025 pneumonia hosptitalization. Patient denies epigastric pain solid food dysphagia melena. Reports "lump in throat" which may be of relevance. Had referral to GI in the [facility] system, claims to have never seen.  *remote history of Left breast cancer with jessie and potentially bony metastatic involvement status post lumpectomy, unspecified chemo radiation, back in the 1990s. subsequent recurrences most recently 2019 treated with Herceptin via her Port-A-Cath, which she completed. Will complete 5-year exemestane February 2025.PET scan for some bone pain 2023 showed no evidence of neoplasm. She apparently no longer needs the Port-A-Cath. The Port-A-Cath requires flushing every 2 months, last flushed around August 1.  * GERD, chronically on pantoprazole without breakthrough symptoms solid food dysphagia or melena. She had an [id number].  *Hyperlipidemia. Chronically on simvastatin 40.  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 No known cardiovascular disease per patient's, but sees cardiology annually. She had echo and stress test in the past, apparently not recently.  *Left macular degeneration. Just saw her ophthalmologist August 2024. Never injected.  *Vitamin D deficiency. Chronically on 0515-2782 IU vitamin D3.Osteopenia on dexa 2024  *Right total knee arthroplasty, March 2024. Completed physical therapy. Doing well. Denies giveaway weakness or pain in her other joints.  * Routine adult health maintenance Vaccinations: Tdap Prevnar 20 Shingrix COVID flu indicated, obtain outside vaccination record from PCP Colon cancer screening: Last colonoscopy around age 80. Denies personal history of adenomas or family history colorectal cancer Breast cancer Surveillance: see above Cervical cancer screen Greening: Aged out  osteoporosis screening: Osteopenia on DEXA 2024 vit D 43, 2024 Aged out of H CV HIV screening GOALS: coronary angiography, chest CT initial report from [facility], and organize follow up CT, trazodone then aricept trial, engage daughter Tonya in patient  care. [id number]

## 2025-07-08 NOTE — PHYSICAL EXAM
[Normal] : no acute distress, well nourished, well developed and well-appearing [de-identified] : Occasionally vague historian.  When asked for Tonya's phone number, the number entered into her cell phone was area code, then number*number, then for more numbers.  In other words an incomplete phone number

## 2025-07-26 NOTE — PHYSICAL EXAM
[Restricted in physically strenuous activity but ambulatory and able to carry out work of a light or sedentary nature] : Status 1- Restricted in physically strenuous activity but ambulatory and able to carry out work of a light or sedentary nature, e.g., light house work, office work [Normal] : affect appropriate [de-identified] : L sided port site C/D/I  [de-identified] : left lumpectomy without any palpable masses or axillary LN; no abnl masses over the R breast

## 2025-07-26 NOTE — ASSESSMENT
[FreeTextEntry1] : She is a 87 y/o  F with (T2N2M1) oligometastatic left breast ER >90%, NH positive, Her2 positive breast cancer who was treated with paclitaxel/ trastuzumab followed by trastuzumab every 3 weeks x 3 years and exemestane endocrine therapy. She had prior RT to oligometastatic sites: sternum and left femoral head. We reviewed continued endocrine therapy: exemestane. We reviewed interval CT of the chest/ abd/ pelvis along with bone scan to rule out distant disease recurrence. Questions answered to her satisfaction. She is agreeable with plan. Next follow up in 6 months but earlier if any new symptoms.  Port flush: we reviewed port removal: she is currently on plavix and ASA and she would need to be off plavix for 5 days for removal. Due to recent catheterization, she will most likely not be able to stop plavix. Will check with cardiology.   Bone mets: has been off zolendronic acid after 2 years. Will continue to monitor bones.

## 2025-07-26 NOTE — PHYSICAL EXAM
[Restricted in physically strenuous activity but ambulatory and able to carry out work of a light or sedentary nature] : Status 1- Restricted in physically strenuous activity but ambulatory and able to carry out work of a light or sedentary nature, e.g., light house work, office work [Normal] : affect appropriate [de-identified] : L sided port site C/D/I  [de-identified] : left lumpectomy without any palpable masses or axillary LN; no abnl masses over the R breast

## 2025-07-26 NOTE — HISTORY OF PRESENT ILLNESS
[Disease: _____________________] : Disease: [unfilled] [AJCC Stage: ____] : AJCC Stage: [unfilled] [de-identified] : Age 80: metastatic breast cancer to the bone: oligometastatic She was treated on 9/15/2019 T2N2 s/p lumpectomy and ALND showing ER>90%, ME positive, Her2 3+ breast cancer. Treated by Dr Baird/ [place] in [place]. She underwent weekly TH followed by 1 year of trastuzumab. She had Pet/ CT done on 11/2020 showing sclerotic metastatic disease in the L femoral head. She underwent RT to the L femoral head. She was initially started on anastrozole 1/2020 but experienced insomnia and nocturia. She was on single agent Her2 directed therapy: trastuzumab for 3 years and then stopped in 2022. She was switched to exemestane on 2/2020 and has remained on therapy. She has been on surveillance with Pet/ CT and last Pet/ CT done on 11/72023 showing no new evidence of disease. She had completed 2 years of zolendronic acid.  [de-identified] : ER >90%, MO positive, Her2 positive  [de-identified] : TH 9/2019 to 2020 palliative RT to the left breast, level 3 axilla, supraclavicular region, internal mammary nodes, left sternal lesion and femoral head.  trastuzumab completed 3 years of therapy and then stopped (due to cardiomyopathy on Echo EF 45-50% from 3/2023) anastrozole 1/2020 zolendronic acid x 2 years  exemestane 2/2020 to present  [de-identified] : Since last evaluation, she has been on exemestane and continues to tolerate endocrine therapy. She had cardiac evaluation with cardiac CT that showed severe stenosis of the mid R coronary and left anterior descending coronary artery. She had cardiac catheterization and currently has stent: on plavix and ASA. She will be returning for second stent. She denies any new port discomfort. Denies any HA or cough or back pain. She has been flushing port every 2 months. She had mammogram last done in August.

## 2025-07-26 NOTE — ASSESSMENT
[FreeTextEntry1] : She is a 87 y/o  F with (T2N2M1) oligometastatic left breast ER >90%, NV positive, Her2 positive breast cancer who was treated with paclitaxel/ trastuzumab followed by trastuzumab every 3 weeks x 3 years and exemestane endocrine therapy. She had prior RT to oligometastatic sites: sternum and left femoral head. We reviewed continued endocrine therapy: exemestane. We reviewed interval CT of the chest/ abd/ pelvis along with bone scan to rule out distant disease recurrence. Questions answered to her satisfaction. She is agreeable with plan. Next follow up in 6 months but earlier if any new symptoms.  Port flush: we reviewed port removal: she is currently on plavix and ASA and she would need to be off plavix for 5 days for removal. Due to recent catheterization, she will most likely not be able to stop plavix. Will check with cardiology.   Bone mets: has been off zolendronic acid after 2 years. Will continue to monitor bones.

## 2025-07-26 NOTE — HISTORY OF PRESENT ILLNESS
[Disease: _____________________] : Disease: [unfilled] [AJCC Stage: ____] : AJCC Stage: [unfilled] [de-identified] : Age 80: metastatic breast cancer to the bone: oligometastatic She was treated on 9/15/2019 T2N2 s/p lumpectomy and ALND showing ER>90%, AL positive, Her2 3+ breast cancer. Treated by Dr Baird/ [place] in [place]. She underwent weekly TH followed by 1 year of trastuzumab. She had Pet/ CT done on 11/2020 showing sclerotic metastatic disease in the L femoral head. She underwent RT to the L femoral head. She was initially started on anastrozole 1/2020 but experienced insomnia and nocturia. She was on single agent Her2 directed therapy: trastuzumab for 3 years and then stopped in 2022. She was switched to exemestane on 2/2020 and has remained on therapy. She has been on surveillance with Pet/ CT and last Pet/ CT done on 11/72023 showing no new evidence of disease. She had completed 2 years of zolendronic acid.  [de-identified] : ER >90%, SD positive, Her2 positive  [de-identified] : TH 9/2019 to 2020 palliative RT to the left breast, level 3 axilla, supraclavicular region, internal mammary nodes, left sternal lesion and femoral head.  trastuzumab completed 3 years of therapy and then stopped (due to cardiomyopathy on Echo EF 45-50% from 3/2023) anastrozole 1/2020 zolendronic acid x 2 years  exemestane 2/2020 to present  [de-identified] : Since last evaluation, she has been on exemestane and continues to tolerate endocrine therapy. She had cardiac evaluation with cardiac CT that showed severe stenosis of the mid R coronary and left anterior descending coronary artery. She had cardiac catheterization and currently has stent: on plavix and ASA. She will be returning for second stent. She denies any new port discomfort. Denies any HA or cough or back pain. She has been flushing port every 2 months. She had mammogram last done in August.